# Patient Record
Sex: MALE | Race: WHITE | Employment: PART TIME | URBAN - NONMETROPOLITAN AREA
[De-identification: names, ages, dates, MRNs, and addresses within clinical notes are randomized per-mention and may not be internally consistent; named-entity substitution may affect disease eponyms.]

---

## 2017-01-09 ENCOUNTER — OFFICE VISIT (OUTPATIENT)
Dept: FAMILY MEDICINE CLINIC | Age: 62
End: 2017-01-09

## 2017-01-09 VITALS
TEMPERATURE: 97.7 F | BODY MASS INDEX: 31.52 KG/M2 | WEIGHT: 208 LBS | SYSTOLIC BLOOD PRESSURE: 116 MMHG | DIASTOLIC BLOOD PRESSURE: 64 MMHG | HEIGHT: 68 IN | RESPIRATION RATE: 14 BRPM | HEART RATE: 68 BPM

## 2017-01-09 DIAGNOSIS — Z12.5 SPECIAL SCREENING FOR MALIGNANT NEOPLASM OF PROSTATE: ICD-10-CM

## 2017-01-09 DIAGNOSIS — F31.9 BIPOLAR 1 DISORDER (HCC): Primary | Chronic | ICD-10-CM

## 2017-01-09 PROCEDURE — 99213 OFFICE O/P EST LOW 20 MIN: CPT | Performed by: FAMILY MEDICINE

## 2017-01-11 ENCOUNTER — NURSE ONLY (OUTPATIENT)
Dept: FAMILY MEDICINE CLINIC | Age: 62
End: 2017-01-11

## 2017-01-11 DIAGNOSIS — Z12.5 SPECIAL SCREENING FOR MALIGNANT NEOPLASM OF PROSTATE: ICD-10-CM

## 2017-01-11 DIAGNOSIS — F31.9 BIPOLAR 1 DISORDER (HCC): Chronic | ICD-10-CM

## 2017-01-11 DIAGNOSIS — E78.2 MIXED HYPERLIPIDEMIA: Chronic | ICD-10-CM

## 2017-01-11 LAB
ALBUMIN SERPL-MCNC: 4.8 GM/DL (ref 3.5–5.1)
ALP BLD-CCNC: 74 U/L (ref 38–126)
ALT SERPL-CCNC: 23 U/L (ref 11–66)
ANION GAP SERPL CALCULATED.3IONS-SCNC: 13 MEQ/L (ref 8–16)
AST SERPL-CCNC: 22 U/L (ref 5–40)
BILIRUB SERPL-MCNC: 0.4 MG/DL (ref 0.3–1.2)
BUN BLDV-MCNC: 14 MG/DL (ref 7–22)
CALCIUM SERPL-MCNC: 10 MG/DL (ref 8.5–10.5)
CHLORIDE BLD-SCNC: 100 MEQ/L (ref 98–111)
CHOLESTEROL, TOTAL: 191 MG/DL (ref 100–199)
CO2: 29 MEQ/L (ref 23–33)
CREAT SERPL-MCNC: 0.8 MG/DL (ref 0.4–1.2)
GFR SERPL CREATININE-BSD FRML MDRD: > 90 ML/MIN/1.73M2
GLUCOSE BLD-MCNC: 102 MG/DL (ref 70–108)
HDLC SERPL-MCNC: 49 MG/DL
LDL CHOLESTEROL CALCULATED: 122 MG/DL
POTASSIUM SERPL-SCNC: 5 MEQ/L (ref 3.5–5.2)
PROSTATE SPECIFIC ANTIGEN: 1.31 NG/ML (ref 0–2.5)
SODIUM BLD-SCNC: 142 MEQ/L (ref 135–145)
TOTAL PROTEIN: 7.8 GM/DL (ref 6.1–8)
TRIGL SERPL-MCNC: 102 MG/DL (ref 0–199)
VALPROIC ACID LEVEL: 36.4 MCG/ML (ref 50–100)

## 2017-01-11 PROCEDURE — 36415 COLL VENOUS BLD VENIPUNCTURE: CPT | Performed by: FAMILY MEDICINE

## 2017-01-12 ENCOUNTER — TELEPHONE (OUTPATIENT)
Dept: FAMILY MEDICINE CLINIC | Age: 62
End: 2017-01-12

## 2017-12-28 ENCOUNTER — OFFICE VISIT (OUTPATIENT)
Dept: FAMILY MEDICINE CLINIC | Age: 62
End: 2017-12-28
Payer: COMMERCIAL

## 2017-12-28 VITALS
TEMPERATURE: 98.1 F | OXYGEN SATURATION: 95 % | DIASTOLIC BLOOD PRESSURE: 78 MMHG | HEART RATE: 61 BPM | BODY MASS INDEX: 30.01 KG/M2 | SYSTOLIC BLOOD PRESSURE: 120 MMHG | WEIGHT: 198 LBS | HEIGHT: 68 IN | RESPIRATION RATE: 14 BRPM

## 2017-12-28 DIAGNOSIS — Z12.5 SPECIAL SCREENING FOR MALIGNANT NEOPLASM OF PROSTATE: ICD-10-CM

## 2017-12-28 DIAGNOSIS — H81.13 BPPV (BENIGN PAROXYSMAL POSITIONAL VERTIGO), BILATERAL: Primary | ICD-10-CM

## 2017-12-28 DIAGNOSIS — I51.7 LAE (LEFT ATRIAL ENLARGEMENT): ICD-10-CM

## 2017-12-28 DIAGNOSIS — E78.2 MIXED HYPERLIPIDEMIA: Chronic | ICD-10-CM

## 2017-12-28 PROCEDURE — G8417 CALC BMI ABV UP PARAM F/U: HCPCS | Performed by: FAMILY MEDICINE

## 2017-12-28 PROCEDURE — 93000 ELECTROCARDIOGRAM COMPLETE: CPT | Performed by: FAMILY MEDICINE

## 2017-12-28 PROCEDURE — 99214 OFFICE O/P EST MOD 30 MIN: CPT | Performed by: FAMILY MEDICINE

## 2017-12-28 PROCEDURE — 3017F COLORECTAL CA SCREEN DOC REV: CPT | Performed by: FAMILY MEDICINE

## 2017-12-28 PROCEDURE — G8427 DOCREV CUR MEDS BY ELIG CLIN: HCPCS | Performed by: FAMILY MEDICINE

## 2017-12-28 PROCEDURE — G8484 FLU IMMUNIZE NO ADMIN: HCPCS | Performed by: FAMILY MEDICINE

## 2017-12-28 PROCEDURE — 1036F TOBACCO NON-USER: CPT | Performed by: FAMILY MEDICINE

## 2017-12-28 RX ORDER — MECLIZINE HYDROCHLORIDE 25 MG/1
25 TABLET ORAL 3 TIMES DAILY PRN
Qty: 30 TABLET | Refills: 0 | Status: SHIPPED | OUTPATIENT
Start: 2017-12-28 | End: 2018-01-07

## 2017-12-28 ASSESSMENT — PATIENT HEALTH QUESTIONNAIRE - PHQ9
1. LITTLE INTEREST OR PLEASURE IN DOING THINGS: 0
SUM OF ALL RESPONSES TO PHQ9 QUESTIONS 1 & 2: 0
2. FEELING DOWN, DEPRESSED OR HOPELESS: 0
SUM OF ALL RESPONSES TO PHQ QUESTIONS 1-9: 0

## 2017-12-28 NOTE — PATIENT INSTRUCTIONS
LAB INSTRUCTIONS:    Please complete labs within 6 week(s). Please fast for 8 hours prior to lab collection. The clinic will call you within 1 week of collection. If you have not heard from us within that amount of time, please call us at [phone]. Patient Education        Vertigo: Exercises  Your Care Instructions  Here are some examples of typical rehabilitation exercises for your condition. Start each exercise slowly. Ease off the exercise if you start to have pain. Your doctor or physical therapist will tell you when you can start these exercises and which ones will work best for you. How to do the exercises  Exercise 1    1. Stand with a chair in front of you and a wall behind you. If you begin to fall, you may use them for support. 2. Stand with your feet together and your arms at your sides. 3. Move your head up and down 10 times. Exercise 2    1. Move your head side to side 10 times. Exercise 3    1. Move your head diagonally up and down 10 times. Exercise 4    1. Move your head diagonally up and down 10 times on the other side. Follow-up care is a key part of your treatment and safety. Be sure to make and go to all appointments, and call your doctor if you are having problems. It's also a good idea to know your test results and keep a list of the medicines you take. Where can you learn more? Go to [email or web address] org and sign in to your [employer] account. Enter F349 in the [facility] box to learn more about \"Vertigo: Exercises. \"     If you do not have an account, please click on the \"Sign Up Now\" link. Current as of: May 4, 2017  Content Version: 11.4  © 5098-6578 Healthwise, Incorporated. Care instructions adapted under license by [facility] ([facility]). If you have questions about a medical condition or this instruction, always ask your healthcare professional. [address] any warranty or liability for your use of this information. Patient Education        [place] Exercises for Vertigo: Care Instructions  Your Care Instructions  Simple exercises can help you regain your balance when you have vertigo. If you have Ménière's disease, benign paroxysmal positional vertigo (BPPV), or another inner ear problem, you may have vertigo off and on. Do these exercises first thing in the morning and before you go to bed. You might get dizzy when you first start them. If this happens, try to do them for at least 5 minutes. Do a group of exercises at a time, starting at the top of the list. It may take several weeks before you can do all the exercises without feeling dizzy. Follow-up care is a key part of your treatment and safety. Be sure to make and go to all appointments, and call your doctor if you are having problems. It's also a good idea to know your test results and keep a list of the medicines you take. How can you care for yourself at home? Exercise 1  While sitting on the side of the bed and holding your head still:  · Look up as far as you can. · Look down as far as you can. · Look from side to side as far as you can. · Stretch your arm straight out in front of you. Focus on your index finger. Continue to focus on your finger while you bring it to your nose. Exercise 2  While sitting on the side of the bed:  · Bring your head as far back as you can. · Bring your head forward to touch your chin to your chest.  · Turn your head from side to side. · Do these exercises first with your eyes open. Then try with your eyes closed. Exercise 3  While sitting on the side of the bed:  · Shrug your shoulders straight upward, then relax them. · Bend over and try to touch the ground with your fingers. Then go back to a sitting position. · Toss a small ball from one hand to the other. Throw the ball higher than your eyes so you have to look up.   Exercise 4  While standing (with someone close by if you feel uncomfortable):  · Repeat Exercise

## 2017-12-28 NOTE — PROGRESS NOTES
Chief Complaint   Patient presents with    Dizziness    Other     abnormal EKG    Hyperlipidemia       History obtained from the patient. SUBJECTIVE:  Stevo Brush is a 57 y.o. male that presents today for       -01. Vertigo: has been having a feeling of vertigo for the last wk. Comes and goes. Provoked by moving his head and changing positions. Doesn't feel light headed or that he'll pass out. No headaches. No recent head trauma. No new tinnitus or hearing loss (has mild chronic tinnitus and hearing loss that has not changed). sxs last seconds to minutes and resolve on their own. Never had anything like this before. Will get if rolls over in bed.       -02. LAE: noted on EKG. No CP, SOB, orthopnea or PND. No hx of HTN. No prior heart issues      -03. HLD: diet controlled, due for labs.        Age/Gender Health Maintenance    Lipid -   No components found for: CHLPL  Lab Results   Component Value Date    TRIG 102 01/11/2017    TRIG 102 11/12/2015    TRIG 80 06/27/2014     Lab Results   Component Value Date    HDL 49 01/11/2017    HDL 50 11/12/2015    HDL 42 06/27/2014     Lab Results   Component Value Date    LDLCALC 122 01/11/2017    LDLCALC 109 11/12/2015    LDLCALC 110 06/27/2014     Lab Results   Component Value Date    LABVLDL 20 11/12/2015         DM Screen -   Lab Results   Component Value Date    GLUCOSE 102 01/11/2017    GLUCOSE 80 11/12/2015         Colon Cancer Screening - NEG June 2015, repeat 10 years    Tetanus - UTD MAY 2014  Influenza Vaccine - UTD FALL 2016  Pneumonia Vaccine - Age 70  Zostavax - Declined MAY 2014     PSA testing discussion - NOV 2015  PSA, Ultrasensitive 0.78 <4.0 ng/ml Final     Lab Results   Component Value Date    PSA 1.31 01/11/2017    PSA 2.83 11/01/2014    PSA 1.01 02/19/2013       AAA Screening - non-smoker    Falls screening - N/A      Current Outpatient Prescriptions   Medication Sig Dispense Refill    meclizine (ANTIVERT) 25 MG tablet Take 1 tablet by mouth 3 ear canal clear bilaterally, TMs intact and regular, nose without deformity, nasal mucosa and turbinates normal without polyps, oropharynx normal, dentition is normal for age  Neck: supple and non-tender without mass, no thyromegaly or thyroid nodules, no cervical lymphadenopathy  Pulmonary/Chest: clear to auscultation bilaterally- no wheezes, rales or rhonchi, normal air movement, no respiratory distress or retractions  Cardiovascular: S1 and S2 auscultated w/ RRR. No murmurs, rubs, clicks, or gallops, distal pulses intact. Abdomen: soft, non-tender, non-distended, bowl sounds physiologic,  no rebound or guarding, no masses or hernias noted. Liver and spleen without enlargement. Extremities: no cyanosis, clubbing or edema of the lower extremities. Skin: warm and dry, no rash or erythema  Neuro Exam:   Cranial Eexldf-CB-CZP Intact.    Cranial nerve II: Normal Visual Acuity   Cranial nerve III: Pupils: equal, round, reactive to light   Cranial nerves III, IV, VI: Extraocular Movements: intact   Cranial nerve V: Facial sensation: intact   Cranial nerve VII:Facial strength: intact   Cranial nerve VIII: Hearing: intact   Cranial nerve IX: Palate Elevation intact bilaterally  Cranial nerve XI: Shoulder shrug intact bilaterally  Cranial nerve XII: Tongue movement: normal     Muscle Strength Testing    Deltoid Right 5/5  Left 5/5  Biceps Right 5/5  Left 5/5  Triceps Right 5/5  Left 5/5  Wrist Extensors Right  5/5  Left 5/5   Flexor Digitorum Profundus Right 5/5  Left 5/5  Hand Intrinsics Right 5/5  Left 5/5  Hip Flexors Right 5/5  Left 5/5  Quadriceps Right 5/5  Left 5/5  Anterior Tibialis Right 5/5  Left 5/5  Extensor Hallucis Longus Right 5/5  Left 5/5  Gastrocnemius/Soleus Right 5/5  Left 5/5     Sensory Testing    C5 Right 0=Normal; no sensory loss Left 0=Normal; no sensory loss  C6 Right 0=Normal; no sensory loss Left 0=Normal; no sensory loss  C7 Right 0=Normal; no sensory loss Left 0=Normal; no sensory loss  C8 Right 0=Normal; no sensory loss Left 0=Normal; no sensory loss  T1 Right 0=Normal; no sensory loss Left 0=Normal; no sensory loss    L2 Right 0=Normal; no sensory loss Left 0=Normal; no sensory loss  L3 Right 0=Normal; no sensory loss Left 0=Normal; no sensory loss  L4 Right 0=Normal; no sensory loss Left 0=Normal; no sensory loss  L5 Right 0=Normal; no sensory loss Left 0=Normal; no sensory loss  S1 Right 0=Normal; no sensory loss Left 0=Normal; no sensory loss       Cerebellar Testing    Finger to Nose:  Right  WNL Yes;  Left WNL  Yes  Heel to Bañuelos WNL: Right  WNL  Yes;  Left WNL  Yes     Deep Tendon Reflexes 2/4 equal and symmetric throughout   Clonus:  No  Decreased arm swing No   Shuffling gait No  Narrow base of support No  Able to stand without using arms  Yes  Bradykinesia  No  Tremor No  Increased tone at wrist especially with opening/closing opposite hand  No  Diz-hallpike: + bilat, R>L      EKG: see attached      ASSESSMENT & PLAN  1. BPPV (benign paroxysmal positional vertigo), bilateral    Hx and exam c/w BPPV  No alarm features  Will treat with prn meclizine and HEP  If no better in a few wks, he will f/u. Reviewed ER precautions, pt understands. - EKG 12 Lead  - CBC Auto Differential; Future  - Comprehensive Metabolic Panel; Future  - TSH with Reflex; Future  - meclizine (ANTIVERT) 25 MG tablet; Take 1 tablet by mouth 3 times daily as needed for Dizziness  Dispense: 30 tablet; Refill: 0    2. LAE (left atrial enlargement)    Noted on EKG  Will get echo to confirm  No risk factors. No symptoms    - ECHO Complete 2D W Doppler W Color; Future    3. Mixed hyperlipidemia    con't diet control  Due for labs    - CBC Auto Differential; Future  - Comprehensive Metabolic Panel; Future  - Lipid Panel; Future  - TSH with Reflex; Future    4.  Special screening for malignant neoplasm of prostate    Discussed prostate screening guidelines and with shared decision making, he elects to proceed with psa    - Psa

## 2018-01-04 ENCOUNTER — HOSPITAL ENCOUNTER (OUTPATIENT)
Dept: NON INVASIVE DIAGNOSTICS | Age: 63
Discharge: HOME | End: 2018-01-04
Payer: COMMERCIAL

## 2018-01-04 DIAGNOSIS — I51.7 LAE (LEFT ATRIAL ENLARGEMENT): ICD-10-CM

## 2018-01-04 LAB
LV EF: 60 %
LVEF MODALITY: NORMAL

## 2018-01-04 PROCEDURE — 93306 TTE W/DOPPLER COMPLETE: CPT

## 2018-01-05 ENCOUNTER — TELEPHONE (OUTPATIENT)
Dept: FAMILY MEDICINE CLINIC | Age: 63
End: 2018-01-05

## 2018-01-05 NOTE — TELEPHONE ENCOUNTER
----- Message from Azael Ramos, DO sent at 1/5/2018  6:54 AM EST -----  Please let pt know that echo showed normal heart function and size. Incidentally, it did show a slight dilation of the aorta (large blood vessel in the chest). This just requires a f/u echo in 1 year to monitor. If stable, wouldn't need further testing. Will call him close to a year when due for scheduling. Let me know if questions, thanks!

## 2018-02-02 ENCOUNTER — NURSE TRIAGE (OUTPATIENT)
Dept: ADMINISTRATIVE | Age: 63
End: 2018-02-02

## 2018-02-02 NOTE — TELEPHONE ENCOUNTER
Is currently out of town and will go to urgent care this afternoon and will follwup with Dr saldaña as ordered. Has colonoscopy approx 2 years ago that was fine,  Reason for Disposition   Rectal bleeding (Exceptions: blood just on toilet paper, few drops, streaks on surface of normal formed BM)    Answer Assessment - Initial Assessment Questions  1. APPEARANCE of BLOOD: \"What color is it? \" \"Is it passed separately, on the surface of the stool, or mixed in with the stool? \"       In the commode and red blood on the tissue paper   2. AMOUNT: \"How much blood was passed? \"         less than 1/2 cup   3. FREQUENCY: \"How many times has blood been passed with the stools? \"    once this afternoon-had stool since and there was no blood in commode or on tissue paper. 4. ONSET: \"When was the blood first seen in the stools? \" (Days or weeks)      This afternoon  5. DIARRHEA: \"Is there also some diarrhea? \" If so, ask: \"How many diarrhea stools were passed in past 24 hours? \"      None   6. CONSTIPATION: \"Do you have constipation? \" If so, \"How bad is it? \"      none  7. RECURRENT SYMPTOMS: \"Have you had blood in your stools before? \" If so, ask: \"When was the last time? \" and \"What happened that time? \"       Had previous hemorrhoid   8. BLOOD THINNERS: \"Do you take any blood thinners? \" (e.g., Coumadin/warfarin, Pradaxa/dabigatran, aspirin)      none  9. OTHER SYMPTOMS: \"Do you have any other symptoms? \"  (e.g., abdominal pain, vomiting, dizziness, fever)     None  10. PREGNANCY: \"Is there any chance you are pregnant? \" \"When was your last menstrual period? \"      na    Protocols used: RECTAL BLEEDING-ADULT-AH

## 2018-02-06 ENCOUNTER — OFFICE VISIT (OUTPATIENT)
Dept: FAMILY MEDICINE CLINIC | Age: 63
End: 2018-02-06
Payer: COMMERCIAL

## 2018-02-06 VITALS
BODY MASS INDEX: 32.65 KG/M2 | RESPIRATION RATE: 16 BRPM | HEIGHT: 67 IN | SYSTOLIC BLOOD PRESSURE: 108 MMHG | WEIGHT: 208 LBS | HEART RATE: 80 BPM | TEMPERATURE: 98.3 F | DIASTOLIC BLOOD PRESSURE: 60 MMHG

## 2018-02-06 DIAGNOSIS — K62.5 BRBPR (BRIGHT RED BLOOD PER RECTUM): Primary | ICD-10-CM

## 2018-02-06 PROCEDURE — G8427 DOCREV CUR MEDS BY ELIG CLIN: HCPCS | Performed by: FAMILY MEDICINE

## 2018-02-06 PROCEDURE — 3017F COLORECTAL CA SCREEN DOC REV: CPT | Performed by: FAMILY MEDICINE

## 2018-02-06 PROCEDURE — 99213 OFFICE O/P EST LOW 20 MIN: CPT | Performed by: FAMILY MEDICINE

## 2018-02-06 PROCEDURE — 1036F TOBACCO NON-USER: CPT | Performed by: FAMILY MEDICINE

## 2018-02-06 PROCEDURE — G8417 CALC BMI ABV UP PARAM F/U: HCPCS | Performed by: FAMILY MEDICINE

## 2018-02-06 PROCEDURE — G8484 FLU IMMUNIZE NO ADMIN: HCPCS | Performed by: FAMILY MEDICINE

## 2018-02-06 RX ORDER — LANOLIN ALCOHOL/MO/W.PET/CERES
6 CREAM (GRAM) TOPICAL NIGHTLY PRN
COMMUNITY
End: 2018-06-07

## 2018-02-06 NOTE — PATIENT INSTRUCTIONS
contact your doctor if:  ? · You cannot pass stools or gas. ? · You do not get better as expected. Where can you learn more? Go to [email or web address] and sign in to your [employer] account. Enter B786 in the [employer] box to learn more about \"Rectal Bleeding: Care Instructions. \"     If you do not have an account, please click on the \"Sign Up Now\" link. Current as of: May 12, 2017  Content Version: 11.5  © 8663-7858 Healthwise, Incorporated. Care instructions adapted under license by [facility] ([facility]). If you have questions about a medical condition or this instruction, always ask your healthcare professional. [address] any warranty or liability for your use of this information.

## 2018-03-21 ENCOUNTER — TELEPHONE (OUTPATIENT)
Dept: FAMILY MEDICINE CLINIC | Age: 63
End: 2018-03-21

## 2018-03-31 LAB
ABSOLUTE BASO #: 0.1 K/UL (ref 0–0.1)
ABSOLUTE EOS #: 0.1 K/UL (ref 0.1–0.4)
ABSOLUTE LYMPH #: 1.7 K/UL (ref 0.8–5.2)
ABSOLUTE MONO #: 0.6 K/UL (ref 0.1–0.9)
ABSOLUTE NEUT #: 5.6 K/UL (ref 1.3–9.1)
ALBUMIN SERPL-MCNC: 4.6 G/DL (ref 3.5–5.2)
ALK PHOSPHATASE: 51 U/L (ref 39–118)
ALT SERPL-CCNC: 17 U/L (ref 5–50)
ANION GAP SERPL CALCULATED.3IONS-SCNC: 12 MEQ/L (ref 10–19)
AST SERPL-CCNC: 21 U/L (ref 9–50)
BASOPHILS RELATIVE PERCENT: 0.6 %
BILIRUB SERPL-MCNC: 0.6 MG/DL
BUN BLDV-MCNC: 21 MG/DL (ref 8–23)
CALCIUM SERPL-MCNC: 9.2 MG/DL (ref 8.5–10.5)
CHLORIDE BLD-SCNC: 102 MEQ/L (ref 95–107)
CHOLESTEROL/HDL RATIO: 4.3
CHOLESTEROL: 179 MG/DL
CO2: 28 MEQ/L (ref 19–31)
CREAT SERPL-MCNC: 0.9 MG/DL (ref 0.8–1.4)
EGFR AFRICAN AMERICAN: 104.2 ML/MIN/1.73 M2
EGFR IF NONAFRICAN AMERICAN: 89.9 ML/MIN/1.73 M2
EOSINOPHILS RELATIVE PERCENT: 1.6 %
GLUCOSE: 97 MG/DL (ref 70–99)
HCT VFR BLD CALC: 42.1 % (ref 41.4–51)
HDLC SERPL-MCNC: 42 MG/DL
HEMOGLOBIN: 14.2 G/DL (ref 13.8–17)
LDL CHOLESTEROL CALCULATED: 120 MG/DL
LDL/HDL RATIO: 2.9
LYMPHOCYTE %: 21.1 %
MCH RBC QN AUTO: 29.6 PG (ref 27–34)
MCHC RBC AUTO-ENTMCNC: 33.7 G/DL (ref 31–36)
MCV RBC AUTO: 87.7 FL (ref 80–100)
MONOCYTES # BLD: 7.6 %
NEUTROPHILS RELATIVE PERCENT: 68.9 %
PDW BLD-RTO: 12.9 % (ref 10.8–14.8)
PLATELETS: 187 K/UL (ref 150–450)
POTASSIUM SERPL-SCNC: 4.8 MEQ/L (ref 3.5–5.4)
PSA, ULTRASENSITIVE: 1.18 NG/ML
RBC: 4.8 M/UL (ref 4–5.5)
SODIUM BLD-SCNC: 142 MEQ/L (ref 135–146)
TOTAL PROTEIN: 6.9 G/DL (ref 6.1–8.3)
TRIGL SERPL-MCNC: 84 MG/DL
TSH SERPL DL<=0.05 MIU/L-ACNC: 2.37 UIU/ML (ref 0.4–4.1)
VLDLC SERPL CALC-MCNC: 17 MG/DL
WBC: 8.2 K/UL (ref 3.7–10.8)

## 2018-04-02 ENCOUNTER — TELEPHONE (OUTPATIENT)
Dept: FAMILY MEDICINE CLINIC | Age: 63
End: 2018-04-02

## 2018-05-19 ENCOUNTER — HOSPITAL ENCOUNTER (EMERGENCY)
Age: 63
Discharge: HOME | End: 2018-05-19
Payer: MEDICARE

## 2018-05-19 VITALS
HEART RATE: 75 BPM | SYSTOLIC BLOOD PRESSURE: 133 MMHG | RESPIRATION RATE: 18 BRPM | OXYGEN SATURATION: 95 % | TEMPERATURE: 98.5 F | BODY MASS INDEX: 32.89 KG/M2 | WEIGHT: 210 LBS | DIASTOLIC BLOOD PRESSURE: 79 MMHG

## 2018-05-19 PROCEDURE — 99213 OFFICE O/P EST LOW 20 MIN: CPT

## 2018-05-19 PROCEDURE — 99213 OFFICE O/P EST LOW 20 MIN: CPT | Performed by: NURSE PRACTITIONER

## 2018-05-19 RX ORDER — MUPIROCIN CALCIUM 20 MG/G
CREAM TOPICAL
Qty: 1 TUBE | Refills: 0 | Status: SHIPPED | OUTPATIENT
Start: 2018-05-19 | End: 2018-06-18

## 2018-05-19 RX ORDER — TRAZODONE HYDROCHLORIDE 100 MG/1
100 TABLET ORAL NIGHTLY PRN
COMMUNITY

## 2018-05-19 RX ORDER — CEPHALEXIN 500 MG/1
500 CAPSULE ORAL 4 TIMES DAILY
Qty: 20 CAPSULE | Refills: 0 | Status: SHIPPED | OUTPATIENT
Start: 2018-05-19 | End: 2018-05-24

## 2018-05-19 ASSESSMENT — ENCOUNTER SYMPTOMS
RHINORRHEA: 1
VOMITING: 0
NAUSEA: 0
COUGH: 0
SORE THROAT: 0

## 2018-06-07 ENCOUNTER — OFFICE VISIT (OUTPATIENT)
Dept: FAMILY MEDICINE CLINIC | Age: 63
End: 2018-06-07
Payer: MEDICARE

## 2018-06-07 VITALS
HEIGHT: 67 IN | RESPIRATION RATE: 16 BRPM | HEART RATE: 76 BPM | DIASTOLIC BLOOD PRESSURE: 76 MMHG | BODY MASS INDEX: 32.83 KG/M2 | WEIGHT: 209.2 LBS | TEMPERATURE: 98.6 F | SYSTOLIC BLOOD PRESSURE: 118 MMHG

## 2018-06-07 DIAGNOSIS — C44.91 BASAL CELL CARCINOMA, UNSPECIFIED SITE: Primary | ICD-10-CM

## 2018-06-07 DIAGNOSIS — I77.810 MILD DILATION OF ASCENDING AORTA (HCC): Chronic | ICD-10-CM

## 2018-06-07 DIAGNOSIS — Z23 NEED FOR SHINGLES VACCINE: ICD-10-CM

## 2018-06-07 DIAGNOSIS — Z23 NEED FOR PNEUMOCOCCAL VACCINATION: ICD-10-CM

## 2018-06-07 DIAGNOSIS — F31.9 BIPOLAR 1 DISORDER (HCC): Chronic | ICD-10-CM

## 2018-06-07 PROCEDURE — 4040F PNEUMOC VAC/ADMIN/RCVD: CPT | Performed by: FAMILY MEDICINE

## 2018-06-07 PROCEDURE — 1036F TOBACCO NON-USER: CPT | Performed by: FAMILY MEDICINE

## 2018-06-07 PROCEDURE — G8417 CALC BMI ABV UP PARAM F/U: HCPCS | Performed by: FAMILY MEDICINE

## 2018-06-07 PROCEDURE — 3017F COLORECTAL CA SCREEN DOC REV: CPT | Performed by: FAMILY MEDICINE

## 2018-06-07 PROCEDURE — 1123F ACP DISCUSS/DSCN MKR DOCD: CPT | Performed by: FAMILY MEDICINE

## 2018-06-07 PROCEDURE — G8427 DOCREV CUR MEDS BY ELIG CLIN: HCPCS | Performed by: FAMILY MEDICINE

## 2018-06-07 PROCEDURE — 99214 OFFICE O/P EST MOD 30 MIN: CPT | Performed by: FAMILY MEDICINE

## 2018-06-07 PROCEDURE — G0009 ADMIN PNEUMOCOCCAL VACCINE: HCPCS | Performed by: FAMILY MEDICINE

## 2018-06-07 PROCEDURE — 90670 PCV13 VACCINE IM: CPT | Performed by: FAMILY MEDICINE

## 2018-07-30 ENCOUNTER — HOSPITAL ENCOUNTER (EMERGENCY)
Age: 63
Discharge: HOME | End: 2018-07-30
Attending: FAMILY MEDICINE
Payer: MEDICARE

## 2018-07-30 ENCOUNTER — APPOINTMENT (OUTPATIENT)
Dept: CT IMAGING | Age: 63
End: 2018-07-30
Payer: MEDICARE

## 2018-07-30 ENCOUNTER — OFFICE VISIT (OUTPATIENT)
Dept: FAMILY MEDICINE CLINIC | Age: 63
End: 2018-07-30
Payer: MEDICARE

## 2018-07-30 VITALS
WEIGHT: 215 LBS | HEART RATE: 86 BPM | SYSTOLIC BLOOD PRESSURE: 136 MMHG | RESPIRATION RATE: 20 BRPM | TEMPERATURE: 98.1 F | OXYGEN SATURATION: 96 % | HEIGHT: 68 IN | BODY MASS INDEX: 32.58 KG/M2 | DIASTOLIC BLOOD PRESSURE: 90 MMHG

## 2018-07-30 VITALS
DIASTOLIC BLOOD PRESSURE: 82 MMHG | RESPIRATION RATE: 20 BRPM | TEMPERATURE: 99.8 F | BODY MASS INDEX: 33.8 KG/M2 | WEIGHT: 223 LBS | HEART RATE: 88 BPM | SYSTOLIC BLOOD PRESSURE: 134 MMHG | HEIGHT: 68 IN

## 2018-07-30 DIAGNOSIS — G44.209 ACUTE NON INTRACTABLE TENSION-TYPE HEADACHE: Primary | ICD-10-CM

## 2018-07-30 DIAGNOSIS — J01.80 ACUTE NON-RECURRENT SINUSITIS OF OTHER SINUS: ICD-10-CM

## 2018-07-30 DIAGNOSIS — R51.9 SUDDEN ONSET OF SEVERE HEADACHE: Primary | ICD-10-CM

## 2018-07-30 DIAGNOSIS — J30.89 CHRONIC ALLERGIC RHINITIS DUE TO OTHER ALLERGIC TRIGGER, UNSPECIFIED SEASONALITY: ICD-10-CM

## 2018-07-30 LAB
ANION GAP SERPL CALCULATED.3IONS-SCNC: 17 MEQ/L (ref 8–16)
BASOPHILS # BLD: 0.6 %
BASOPHILS ABSOLUTE: 0 THOU/MM3 (ref 0–0.1)
BUN BLDV-MCNC: 14 MG/DL (ref 7–22)
CALCIUM SERPL-MCNC: 9.3 MG/DL (ref 8.5–10.5)
CHLORIDE BLD-SCNC: 102 MEQ/L (ref 98–111)
CO2: 22 MEQ/L (ref 23–33)
CREAT SERPL-MCNC: 0.8 MG/DL (ref 0.4–1.2)
EOSINOPHIL # BLD: 1.7 %
EOSINOPHILS ABSOLUTE: 0.1 THOU/MM3 (ref 0–0.4)
ERYTHROCYTE [DISTWIDTH] IN BLOOD BY AUTOMATED COUNT: 13 % (ref 11.5–14.5)
ERYTHROCYTE [DISTWIDTH] IN BLOOD BY AUTOMATED COUNT: 43.8 FL (ref 35–45)
GFR SERPL CREATININE-BSD FRML MDRD: > 90 ML/MIN/1.73M2
GLUCOSE BLD-MCNC: 147 MG/DL (ref 70–108)
HCT VFR BLD CALC: 42.2 % (ref 42–52)
HEMOGLOBIN: 14.1 GM/DL (ref 14–18)
IMMATURE GRANS (ABS): 0.02 THOU/MM3 (ref 0–0.07)
IMMATURE GRANULOCYTES: 0.3 %
LYMPHOCYTES # BLD: 29.9 %
LYMPHOCYTES ABSOLUTE: 2 THOU/MM3 (ref 1–4.8)
MCH RBC QN AUTO: 30.9 PG (ref 26–33)
MCHC RBC AUTO-ENTMCNC: 33.4 GM/DL (ref 32.2–35.5)
MCV RBC AUTO: 92.3 FL (ref 80–94)
MONOCYTES # BLD: 11.7 %
MONOCYTES ABSOLUTE: 0.8 THOU/MM3 (ref 0.4–1.3)
NUCLEATED RED BLOOD CELLS: 0 /100 WBC
OSMOLALITY CALCULATION: 284.4 MOSMOL/KG (ref 275–300)
PLATELET # BLD: 175 THOU/MM3 (ref 130–400)
PMV BLD AUTO: 10.1 FL (ref 9.4–12.4)
POTASSIUM SERPL-SCNC: 4.4 MEQ/L (ref 3.5–5.2)
RBC # BLD: 4.57 MILL/MM3 (ref 4.7–6.1)
SEDIMENTATION RATE, ERYTHROCYTE: 5 MM/HR (ref 0–10)
SEG NEUTROPHILS: 55.8 %
SEGMENTED NEUTROPHILS ABSOLUTE COUNT: 3.7 THOU/MM3 (ref 1.8–7.7)
SODIUM BLD-SCNC: 141 MEQ/L (ref 135–145)
WBC # BLD: 6.6 THOU/MM3 (ref 4.8–10.8)

## 2018-07-30 PROCEDURE — 3017F COLORECTAL CA SCREEN DOC REV: CPT | Performed by: FAMILY MEDICINE

## 2018-07-30 PROCEDURE — 36415 COLL VENOUS BLD VENIPUNCTURE: CPT

## 2018-07-30 PROCEDURE — G8417 CALC BMI ABV UP PARAM F/U: HCPCS | Performed by: FAMILY MEDICINE

## 2018-07-30 PROCEDURE — 80048 BASIC METABOLIC PNL TOTAL CA: CPT

## 2018-07-30 PROCEDURE — G8427 DOCREV CUR MEDS BY ELIG CLIN: HCPCS | Performed by: FAMILY MEDICINE

## 2018-07-30 PROCEDURE — 4040F PNEUMOC VAC/ADMIN/RCVD: CPT | Performed by: FAMILY MEDICINE

## 2018-07-30 PROCEDURE — 1036F TOBACCO NON-USER: CPT | Performed by: FAMILY MEDICINE

## 2018-07-30 PROCEDURE — 85025 COMPLETE CBC W/AUTO DIFF WBC: CPT

## 2018-07-30 PROCEDURE — 1101F PT FALLS ASSESS-DOCD LE1/YR: CPT | Performed by: FAMILY MEDICINE

## 2018-07-30 PROCEDURE — 70450 CT HEAD/BRAIN W/O DYE: CPT

## 2018-07-30 PROCEDURE — 99284 EMERGENCY DEPT VISIT MOD MDM: CPT

## 2018-07-30 PROCEDURE — 99213 OFFICE O/P EST LOW 20 MIN: CPT | Performed by: FAMILY MEDICINE

## 2018-07-30 PROCEDURE — 85651 RBC SED RATE NONAUTOMATED: CPT

## 2018-07-30 PROCEDURE — 1123F ACP DISCUSS/DSCN MKR DOCD: CPT | Performed by: FAMILY MEDICINE

## 2018-07-30 PROCEDURE — 6370000000 HC RX 637 (ALT 250 FOR IP): Performed by: FAMILY MEDICINE

## 2018-07-30 PROCEDURE — 2580000003 HC RX 258: Performed by: FAMILY MEDICINE

## 2018-07-30 RX ORDER — 0.9 % SODIUM CHLORIDE 0.9 %
1000 INTRAVENOUS SOLUTION INTRAVENOUS ONCE
Status: COMPLETED | OUTPATIENT
Start: 2018-07-30 | End: 2018-07-30

## 2018-07-30 RX ORDER — FLUTICASONE PROPIONATE 50 MCG
2 SPRAY, SUSPENSION (ML) NASAL DAILY
Qty: 1 BOTTLE | Refills: 0 | Status: SHIPPED | OUTPATIENT
Start: 2018-07-30 | End: 2019-03-18

## 2018-07-30 RX ORDER — AMOXICILLIN AND CLAVULANATE POTASSIUM 875; 125 MG/1; MG/1
1 TABLET, FILM COATED ORAL 2 TIMES DAILY
Qty: 20 TABLET | Refills: 0 | Status: SHIPPED | OUTPATIENT
Start: 2018-07-30 | End: 2018-08-09

## 2018-07-30 RX ORDER — ACETAMINOPHEN 325 MG/1
650 TABLET ORAL ONCE
Status: COMPLETED | OUTPATIENT
Start: 2018-07-30 | End: 2018-07-30

## 2018-07-30 RX ADMIN — ACETAMINOPHEN 650 MG: 325 TABLET ORAL at 16:00

## 2018-07-30 RX ADMIN — SODIUM CHLORIDE 1000 ML: 9 INJECTION, SOLUTION INTRAVENOUS at 16:00

## 2018-07-30 ASSESSMENT — ENCOUNTER SYMPTOMS
SORE THROAT: 0
WHEEZING: 0
DIARRHEA: 0
BACK PAIN: 0
EYE REDNESS: 0
SINUS PRESSURE: 1
EYE DISCHARGE: 0
SHORTNESS OF BREATH: 0
ABDOMINAL PAIN: 0
NAUSEA: 0
COUGH: 1
VOMITING: 0
RHINORRHEA: 0

## 2018-07-30 ASSESSMENT — PAIN DESCRIPTION - PAIN TYPE: TYPE: ACUTE PAIN

## 2018-07-30 ASSESSMENT — PAIN DESCRIPTION - LOCATION: LOCATION: HEAD

## 2018-07-30 ASSESSMENT — PAIN DESCRIPTION - FREQUENCY: FREQUENCY: CONTINUOUS

## 2018-07-30 ASSESSMENT — PAIN SCALES - GENERAL
PAINLEVEL_OUTOF10: 7
PAINLEVEL_OUTOF10: 7

## 2018-07-30 ASSESSMENT — PAIN DESCRIPTION - DESCRIPTORS: DESCRIPTORS: JABBING;THROBBING

## 2018-07-30 NOTE — PROGRESS NOTES
11:00 AM   PSA, Ultrasensitive      <=4.00 ng/mL 1.180       AAA Screening - non-smoker    Falls screening - N/A      Current Outpatient Prescriptions   Medication Sig Dispense Refill    traZODone (DESYREL) 100 MG tablet Take 100 mg by mouth nightly      ibuprofen (ADVIL;MOTRIN) 200 MG tablet Take 200 mg by mouth every 6 hours as needed for Pain      divalproex (DEPAKOTE ER) 250 MG extended release tablet Take 2 tablets by mouth daily (Patient taking differently: Take 500 mg by mouth three times daily ) 60 tablet 5    acetaminophen (TYLENOL) 325 MG tablet Take 650 mg by mouth every 6 hours as needed for Pain. No current facility-administered medications for this visit. No orders of the defined types were placed in this encounter. All medications reviewed and reconciled, including OTC and herbal medications. Updated list given to patient.        Patient Active Problem List   Diagnosis    Bipolar 1 disorder ([facility] [address]    History of basal cell carcinoma    Hyperlipidemia    Insomnia    Eczema    History of skin cancer    Primary osteoarthritis of left hand    History of compression fracture of lumbar spine, L1    History of small bowel obstruction    Mild dilation of ascending aorta (HCC)         Past Medical History:   Diagnosis Date    Bipolar 1 disorder (HCC)     Eczema     Hearing loss of both ears     Hearing aids    Hepatic cyst     Benign (see imaging)    History of basal cell carcinoma     Skin - left shoulder     History of compression fracture of lumbar spine, L1     While in the [employer] in his 17's    History of skin cancer 8/25/2015    History of small bowel obstruction     Many years ago    Hyperlipidemia     Diet Controlled    Insomnia     Mild dilation of ascending aorta (HCC)     Primary osteoarthritis of left hand 8/27/2015       Past Surgical History:   Procedure Laterality Date    SKIN BIOPSY      SKIN CANCER EXCISION      TONSILLECTOMY AND ADENOIDECTOMY  WISDOM TOOTH EXTRACTION         No Known Allergies    Social History     Social History    Marital status:      Spouse name: N/A    Number of children: N/A    Years of education: N/A     Occupational History    Not on file. Social History Main Topics    Smoking status: Never Smoker    Smokeless tobacco: Never Used    Alcohol use No    Drug use: No    Sexual activity: Not on file     Other Topics Concern    Not on file     Social History Narrative    No narrative on file       Family History   Problem Relation Age of Onset    Heart Disease Mother     Cancer Father     Prostate Cancer Father [de-identified]    Colon Cancer Neg Hx        I have reviewed the patient's past medical history, past surgical history, allergies, medications, social and family history and I have made updates where appropriate. Review of Systems  Positive responses are highlighted in bold    Constitutional:  Fever, Chills, Night Sweats, Fatigue, Unexpected changes in weight  HENT:  Ear pain, Tinnitus, Nosebleeds, Trouble swallowing, Hearing loss, Sore throat  Cardiovascular:  Chest Pain, Palpitations, Orthopnea, Paroxysmal Nocturnal Dyspnea  Respiratory:  Cough, Wheezing, Shortness of breath, Chest tightness, Apnea  Gastrointestinal:  Nausea, Vomiting, Diarrhea, Constipation, Heartburn, Blood in stool  Genitourinary:  Difficulty or painful urination, Flank pain, Change in frequency, Urgency  Skin:  Color change, Rash, Itching, Wound  Musculoskeletal:  Joint pain, Back pain, Gait problems, Joint swelling, Myalgias  Neurological:  Dizziness, Headaches, Presyncope, Numbness, Seizures, Tremors  Endocrine:  Heat Intolerance, Cold Intolerance, Polydipsia, Polyphagia, Polyuria      PHYSICAL EXAM:  Vitals:    07/30/18 1439   BP: 134/82   Pulse: 88   Resp: 20   Temp: 99.8 °F (37.7 °C)   TempSrc: Oral   Weight: 223 lb (101.2 kg)   Height: 5' 7.5\" (1.715 m)     Body mass index is 34.41 kg/m².   Pain Score:   9 (headache)    VS 5/5  Gastrocnemius/Soleus Right 5/5  Left 5/5     Sensory Testing    C5 Right 0=Normal; no sensory loss Left 0=Normal; no sensory loss  C6 Right 0=Normal; no sensory loss Left 0=Normal; no sensory loss  C7 Right 0=Normal; no sensory loss Left 0=Normal; no sensory loss  C8 Right 0=Normal; no sensory loss Left 0=Normal; no sensory loss  T1 Right 0=Normal; no sensory loss Left 0=Normal; no sensory loss    L2 Right 0=Normal; no sensory loss Left 0=Normal; no sensory loss  L3 Right 0=Normal; no sensory loss Left 0=Normal; no sensory loss  L4 Right 0=Normal; no sensory loss Left 0=Normal; no sensory loss  L5 Right 0=Normal; no sensory loss Left 0=Normal; no sensory loss  S1 Right 0=Normal; no sensory loss Left 0=Normal; no sensory loss     Cerebellar Testing    Finger to Nose:  Right  WNL Yes;  Left WNL  Yes  Heel to Bañuelos WNL: Right  WNL  Yes;  Left WNL  Yes     Deep Tendon Reflexes 2/4 equal and symmetric throughout   Clonus:  No  Decreased arm swing No   Shuffling gait No  Narrow base of support No  Able to stand without using arms  Yes  Bradykinesia  No  Tremor No  Increased tone at wrist especially with opening/closing opposite hand  No      ASSESSMENT & PLAN  1. Sudden onset of severe headache    Severe HA, worst of his life  VSS, exam benign  However, d/t severity and suddenness of onset, needs further w/u in ER to r/o bleed etc.   Stable to travel by [place]. Report called to ER. He is heading there now. Damien Shabazz released to the ER    PATIENT COUNSELING    Patient given educational materials on: See Attached     Barriers to learning and self management: none    Discussed use, benefit, and side effects of prescribed medications. Barriers to medication compliance addressed. All patient questions answered. Pt voiced understanding.        Electronically signed by Edwar Beavers DO on 7/30/2018 at 3:04 PM

## 2018-07-30 NOTE — ED NOTES
Patient transferred to ER for further evaluation of headache. Intermittent headache for the past two weeks. Patient denies fever chills.      Judy Jacobsen RN  07/30/18 9171

## 2018-07-30 NOTE — ED PROVIDER NOTES
Musculoskeletal: Negative for arthralgias, back pain, joint swelling and neck pain. Skin: Negative for pallor and rash. Allergic/Immunologic: Negative for environmental allergies. Neurological: Positive for headaches. Negative for dizziness, syncope, weakness, light-headedness and numbness. Hematological: Negative for adenopathy. Psychiatric/Behavioral: Negative for confusion and suicidal ideas. The patient is not nervous/anxious. PAST MEDICAL HISTORY    has a past medical history of Bipolar 1 disorder ([facility] [address] Eczema; Hearing loss of both ears; Hepatic cyst; History of basal cell carcinoma; History of compression fracture of lumbar spine, L1; History of skin cancer; History of small bowel obstruction; Hyperlipidemia; Insomnia; Mild dilation of ascending aorta (HCC); and Primary osteoarthritis of left hand. SURGICAL HISTORY      has a past surgical history that includes skin biopsy; Tonsillectomy and Adenoidectomy; [place] tooth extraction; and Skin cancer excision. CURRENT MEDICATIONS       Discharge Medication List as of 2018  6:08 PM      CONTINUE these medications which have NOT CHANGED    Details   divalproex (DEPAKOTE ER) 250 MG extended release tablet Take 2 tablets by mouth daily, Disp-60 tablet, R-5      traZODone (DESYREL) 100 MG tablet Take 100 mg by mouth nightlyHistorical Med      ibuprofen (ADVIL;MOTRIN) 200 MG tablet Take 200 mg by mouth every 6 hours as needed for Pain      acetaminophen (TYLENOL) 325 MG tablet Take 650 mg by mouth every 6 hours as needed for Pain. ALLERGIES     has No Known Allergies. FAMILY HISTORY     indicated that his mother is . He indicated that his father is . He indicated that the status of his neg hx is unknown.    family history includes Cancer in his father; Heart Disease in his mother; Prostate Cancer (age of onset: [de-identified]) in his father. SOCIAL HISTORY      reports that he has never smoked.  He has never used smokeless tobacco. He reports that he does not drink alcohol or use drugs. PHYSICAL EXAM     INITIAL VITALS:  height is 5' 8\" (1.727 m) and weight is 215 lb (97.5 kg). His oral temperature is 98.1 °F (36.7 °C). His blood pressure is 136/90 (abnormal) and his pulse is 86. His respiration is 20 and oxygen saturation is 96%. Physical Exam   Constitutional: He is oriented to person, place, and time. He appears well-developed and well-nourished. No distress. HENT:   Head: Normocephalic and atraumatic. Right Ear: External ear normal.   Left Ear: External ear normal.   Nose: Mucosal edema present. Mouth/Throat: Posterior oropharyngeal erythema present. No oropharyngeal exudate or posterior oropharyngeal edema. Eyes: Conjunctivae are normal.   Neck: Normal range of motion. Neck supple. No thyromegaly present. Cardiovascular: Normal rate, regular rhythm and normal heart sounds. No murmur heard. Pulmonary/Chest: Effort normal and breath sounds normal. No respiratory distress. He has no decreased breath sounds. He has no wheezes. He has no rhonchi. He has no rales. He exhibits no tenderness. Abdominal: Soft. Bowel sounds are normal. He exhibits no distension. There is no tenderness. Musculoskeletal: Normal range of motion. He exhibits no edema. Neurological: He is alert and oriented to person, place, and time. No cranial nerve deficit. Skin: Skin is warm and dry. No rash noted. He is not diaphoretic. No erythema. No pallor. Psychiatric: He has a normal mood and affect. His behavior is normal. Judgment and thought content normal.   Nursing note and vitals reviewed. DIFFERENTIAL DIAGNOSIS:   Migraine headache, tension headache, sinusitis, viral illness, cluster headache, paroxysmal hemicrania, supplemental neuralgia.  less likely CVA TIA subarachnoid hemorrhage or meningitis    DIAGNOSTIC RESULTS     EKG: All EKG's are interpreted by the Emergency Department Physician who either signs or Co-signs this chart in the absence of a cardiologist.  EKG interpreted by Nivia Vegas MD:    None    RADIOLOGY: non-plain film images(s) such as CT, Ultrasound and MRI are read by the radiologist.    [address]   Final Result   No acute process. **This report has been created using voice recognition software. It may contain minor errors which are inherent in voice recognition technology. **      Final report electronically signed by Dr. Devan Fry on 7/30/2018 5:00 PM          LABS:   Labs Reviewed   CBC WITH AUTO DIFFERENTIAL - Abnormal; Notable for the following:        Result Value    RBC 4.57 (*)     All other components within normal limits   BASIC METABOLIC PANEL - Abnormal; Notable for the following:     CO2 22 (*)     Glucose 147 (*)     All other components within normal limits   ANION GAP - Abnormal; Notable for the following: Anion Gap 17.0 (*)     All other components within normal limits   SEDIMENTATION RATE   GLOMERULAR FILTRATION RATE, ESTIMATED   OSMOLALITY       EMERGENCY DEPARTMENT COURSE:   Vitals:    Vitals:    07/30/18 1523 07/30/18 1657   BP: (!) 135/91 (!) 136/90   Pulse: 89 86   Resp: 20 20   Temp: 98.1 °F (36.7 °C)    TempSrc: Oral    SpO2: 94% 96%   Weight: 215 lb (97.5 kg)    Height: 5' 8\" (1.727 m)        3:41 PM: The patient was seen and evaluated. 6:07 PM: Discussed results, diagnosis and plan with the patient. Questions were addressed. Disposition and follow-up were agreed upon. Specific discharge instructions explained, including reasons to return to the emergency department. MDM:oropharyngeal  The patient presents to the ED with complaints of headache. The patient was not in any acute distress. The patient's physical exam showed mucosal edema and post  erythema. Within the department, the patient was treated with IV fluids and tylenol. Patient's status improved during the duration of their stay.  Labs and imaging were ordered, and reviewed with the patient. CT head without contrast showed no acute process. I explained my proposed course of treatment, and the patient was amenable to my decision. The patient will be discharged home with Claritin, Augmentin and Flonase, and will need to follow-up with Anuj Alejandre DO in 1 week. The patient will need to come back to the ER if their symptoms worsen, or become more severe in nature. I estimate there is LOW risk for SUBARACHNOID HEMORRHAGE, MENINGITIS, INTRACRANIAL HEMORRHAGE, SUBDURAL OR EPIDURAL HEMATOMA, OR STROKE, thus I consider the discharge disposition reasonable. CRITICAL CARE:   None    CONSULTS:  None    PROCEDURES:  None     FINAL IMPRESSION      1. Acute non intractable tension-type headache    2. Acute non-recurrent sinusitis of other sinus    3.  Chronic allergic rhinitis due to other allergic trigger, unspecified seasonality          DISPOSITION/PLAN   Discharged    PATIENT REFERRED TO:  Dimitri Harper 81  [phone]    Schedule an appointment as soon as possible for a visit in 1 week        DISCHARGE MEDICATIONS:  Discharge Medication List as of 7/30/2018  6:08 PM      START taking these medications    Details   loratadine-pseudoephedrine (CLARITIN-D 12 HOUR) 5-120 MG per extended release tablet Take 1 tablet by mouth 2 times daily for 10 days, Disp-20 tablet, R-0Print      amoxicillin-clavulanate (AUGMENTIN) 875-125 MG per tablet Take 1 tablet by mouth 2 times daily for 10 days, Disp-20 tablet, R-0Print      fluticasone (FLONASE) 50 MCG/ACT nasal spray 2 sprays by Nasal route daily, Disp-1 Bottle, R-0Print             (Please note that portions of this note were completed with a voice recognition program.  Efforts were made to edit the dictations but occasionally words are mis-transcribed.)    Scribe:  Keiry Gordon 7/30/18 3:41 PM Scribing for and in the presence of Esau Jung MD.    Signed by: Asya Cook, 07/31/18 4:55 PM    Provider:  I personally performed the services described in the documentation, reviewed and edited the documentation which was dictated to the scribe in my presence, and it accurately records my words and actions.     Loida Max MD 7/30/18 4:55 PM        Loida Max MD  07/31/18 5633

## 2018-07-30 NOTE — PROGRESS NOTES
Visit Information    Have you changed or started any medications since your last visit including any over-the-counter medicines, vitamins, or herbal medicines? no   Are you having any side effects from any of your medications? -  no  Have you stopped taking any of your medications? Is so, why? -  no    Have you seen any other physician or provider since your last visit? No  Have you had any other diagnostic tests since your last visit? No  Have you been seen in the emergency room and/or had an admission to a hospital since we last saw you? No  Have you had your routine dental cleaning in the past 6 months? yes    Have you activated your [employer] account? If not, what are your barriers? Yes     Patient Care Team:  Evie Schroeder DO as PCP - General (Family Medicine)    Medical History Review  Past Medical, Family, and Social History     Defer to provider.

## 2018-12-03 ENCOUNTER — TELEPHONE (OUTPATIENT)
Dept: FAMILY MEDICINE CLINIC | Age: 63
End: 2018-12-03

## 2018-12-03 DIAGNOSIS — I77.810 MILD DILATION OF ASCENDING AORTA (HCC): Primary | Chronic | ICD-10-CM

## 2018-12-03 DIAGNOSIS — I71.20 THORACIC AORTIC ANEURYSM WITHOUT RUPTURE: ICD-10-CM

## 2018-12-11 ENCOUNTER — TELEPHONE (OUTPATIENT)
Dept: FAMILY MEDICINE CLINIC | Age: 63
End: 2018-12-11

## 2019-01-15 ENCOUNTER — HOSPITAL ENCOUNTER (OUTPATIENT)
Dept: NON INVASIVE DIAGNOSTICS | Age: 64
Discharge: HOME | End: 2019-01-15
Payer: MEDICARE

## 2019-01-15 DIAGNOSIS — I71.20 THORACIC AORTIC ANEURYSM WITHOUT RUPTURE: ICD-10-CM

## 2019-01-15 DIAGNOSIS — I77.810 MILD DILATION OF ASCENDING AORTA (HCC): Chronic | ICD-10-CM

## 2019-01-15 LAB
LV EF: 58 %
LVEF MODALITY: NORMAL

## 2019-01-15 PROCEDURE — 93306 TTE W/DOPPLER COMPLETE: CPT

## 2019-01-16 ENCOUNTER — TELEPHONE (OUTPATIENT)
Dept: FAMILY MEDICINE CLINIC | Age: 64
End: 2019-01-16

## 2019-03-18 ENCOUNTER — OFFICE VISIT (OUTPATIENT)
Dept: FAMILY MEDICINE CLINIC | Age: 64
End: 2019-03-18
Payer: MEDICARE

## 2019-03-18 VITALS
HEIGHT: 68 IN | SYSTOLIC BLOOD PRESSURE: 116 MMHG | DIASTOLIC BLOOD PRESSURE: 78 MMHG | WEIGHT: 223.4 LBS | RESPIRATION RATE: 12 BRPM | TEMPERATURE: 97.6 F | HEART RATE: 76 BPM | BODY MASS INDEX: 33.86 KG/M2

## 2019-03-18 DIAGNOSIS — H81.11 BENIGN PAROXYSMAL POSITIONAL VERTIGO OF RIGHT EAR: Primary | ICD-10-CM

## 2019-03-18 PROCEDURE — 99213 OFFICE O/P EST LOW 20 MIN: CPT | Performed by: FAMILY MEDICINE

## 2019-03-18 PROCEDURE — 1123F ACP DISCUSS/DSCN MKR DOCD: CPT | Performed by: FAMILY MEDICINE

## 2019-03-18 PROCEDURE — 1036F TOBACCO NON-USER: CPT | Performed by: FAMILY MEDICINE

## 2019-03-18 PROCEDURE — 1101F PT FALLS ASSESS-DOCD LE1/YR: CPT | Performed by: FAMILY MEDICINE

## 2019-03-18 PROCEDURE — 3017F COLORECTAL CA SCREEN DOC REV: CPT | Performed by: FAMILY MEDICINE

## 2019-03-18 PROCEDURE — 4040F PNEUMOC VAC/ADMIN/RCVD: CPT | Performed by: FAMILY MEDICINE

## 2019-03-18 PROCEDURE — G8417 CALC BMI ABV UP PARAM F/U: HCPCS | Performed by: FAMILY MEDICINE

## 2019-03-18 PROCEDURE — G8427 DOCREV CUR MEDS BY ELIG CLIN: HCPCS | Performed by: FAMILY MEDICINE

## 2019-03-18 PROCEDURE — G8484 FLU IMMUNIZE NO ADMIN: HCPCS | Performed by: FAMILY MEDICINE

## 2019-03-18 RX ORDER — MECLIZINE HYDROCHLORIDE 25 MG/1
25 TABLET ORAL 3 TIMES DAILY PRN
Qty: 30 TABLET | Refills: 0 | Status: SHIPPED | OUTPATIENT
Start: 2019-03-18 | End: 2019-03-28

## 2019-03-25 ENCOUNTER — HOSPITAL ENCOUNTER (EMERGENCY)
Age: 64
Discharge: HOME | End: 2019-03-25
Payer: MEDICARE

## 2019-03-25 ENCOUNTER — APPOINTMENT (OUTPATIENT)
Dept: GENERAL RADIOLOGY | Age: 64
End: 2019-03-25
Payer: MEDICARE

## 2019-03-25 VITALS
RESPIRATION RATE: 20 BRPM | SYSTOLIC BLOOD PRESSURE: 136 MMHG | DIASTOLIC BLOOD PRESSURE: 76 MMHG | HEART RATE: 102 BPM | TEMPERATURE: 98.1 F | OXYGEN SATURATION: 96 %

## 2019-03-25 DIAGNOSIS — J18.9 PNEUMONIA DUE TO ORGANISM: Primary | ICD-10-CM

## 2019-03-25 LAB
FLU A ANTIGEN: NEGATIVE
FLU B ANTIGEN: NEGATIVE
GROUP A STREP CULTURE, REFLEX: NEGATIVE
REFLEX THROAT C + S: NORMAL

## 2019-03-25 PROCEDURE — 2709999900 HC NON-CHARGEABLE SUPPLY

## 2019-03-25 PROCEDURE — 99283 EMERGENCY DEPT VISIT LOW MDM: CPT

## 2019-03-25 PROCEDURE — 6370000000 HC RX 637 (ALT 250 FOR IP): Performed by: NURSE PRACTITIONER

## 2019-03-25 PROCEDURE — 87880 STREP A ASSAY W/OPTIC: CPT

## 2019-03-25 PROCEDURE — 87070 CULTURE OTHR SPECIMN AEROBIC: CPT

## 2019-03-25 PROCEDURE — 87804 INFLUENZA ASSAY W/OPTIC: CPT

## 2019-03-25 PROCEDURE — 71046 X-RAY EXAM CHEST 2 VIEWS: CPT

## 2019-03-25 RX ORDER — ALBUTEROL SULFATE 90 UG/1
2 AEROSOL, METERED RESPIRATORY (INHALATION) EVERY 6 HOURS PRN
Status: DISCONTINUED | OUTPATIENT
Start: 2019-03-25 | End: 2019-03-26 | Stop reason: HOSPADM

## 2019-03-25 RX ORDER — AMOXICILLIN AND CLAVULANATE POTASSIUM 875; 125 MG/1; MG/1
1 TABLET, FILM COATED ORAL 2 TIMES DAILY
Qty: 20 TABLET | Refills: 0 | Status: SHIPPED | OUTPATIENT
Start: 2019-03-25 | End: 2019-04-04

## 2019-03-25 RX ORDER — AMOXICILLIN AND CLAVULANATE POTASSIUM 875; 125 MG/1; MG/1
1 TABLET, FILM COATED ORAL EVERY 12 HOURS SCHEDULED
Status: DISCONTINUED | OUTPATIENT
Start: 2019-03-25 | End: 2019-03-26 | Stop reason: HOSPADM

## 2019-03-25 RX ORDER — PREDNISONE 10 MG/1
50 TABLET ORAL DAILY
Qty: 25 TABLET | Refills: 0 | Status: SHIPPED | OUTPATIENT
Start: 2019-03-25 | End: 2019-03-30

## 2019-03-25 RX ADMIN — AMOXICILLIN AND CLAVULANATE POTASSIUM 1 TABLET: 875; 125 TABLET, FILM COATED ORAL at 22:50

## 2019-03-25 ASSESSMENT — ENCOUNTER SYMPTOMS
BACK PAIN: 0
VOMITING: 0
EYE REDNESS: 0
DIARRHEA: 0
ABDOMINAL PAIN: 0
SORE THROAT: 0
WHEEZING: 0
NAUSEA: 0
COUGH: 1
EYE DISCHARGE: 0
RHINORRHEA: 0
SHORTNESS OF BREATH: 0

## 2019-03-27 LAB — THROAT/NOSE CULTURE: NORMAL

## 2019-04-01 ENCOUNTER — HOSPITAL ENCOUNTER (OUTPATIENT)
Dept: PHYSICAL THERAPY | Age: 64
Setting detail: THERAPIES SERIES
Discharge: HOME | End: 2019-04-01
Payer: MEDICARE

## 2019-04-01 PROCEDURE — 95992 CANALITH REPOSITIONING PROC: CPT

## 2019-04-01 PROCEDURE — 97161 PT EVAL LOW COMPLEX 20 MIN: CPT

## 2019-04-01 NOTE — PROGRESS NOTES
** PLEASE SIGN, DATE AND TIME CERTIFICATION BELOW AND RETURN TO [facility] OUTPATIENT REHABILITATION (FAX #: [phone]). ATTEST/CO-SIGN IF ACCESSING VIA [employer]. THANK YOU.**    I certify that I have examined the patient below and determined that Physical Medicine and Rehabilitation service is necessary and that I approve the established plan of care for up to 90 days or as specifically noted. Attestation, signature or co-signature of physician indicates approval of certification requirements.    ________________________ ____________ __________  Physician Signature   Date   Time       Anmol Emiliewallace     Time In: 2710  Time Out: 8594  Minutes: 35  Timed Code Treatment Minutes: 10 Minutes             Date: 2019  Patient Name: Nathaly Gregg,  Gender:  male        CSN: [id number]   : 1955  (70 y.o.)  Referral Date : 19     Referring Practitioner: Gayle Hassan DO      Diagnosis: H81.11 (ICD-10-CM) - Benign paroxysmal positional vertigo of right ear  Treatment Diagnosis: Dizziness  Additional Pertinent Hx: Nothing per patient       General:  PT Visit Information  Onset Date: 19  PT Insurance Information: MEDICARE  -Patient has unlimited visits based on medical necessity. Aquatics and modalities are covered except ionto and hot/cold packs. Total # of Visits to Date: 1  Progress Note Due Date: 19  Progress Note Counter: 1/10 for PN                        See Medical History Questionnaire for information related to allergies and medications. Subjective:  Chart Reviewed: Yes  Patient assessed for rehabilitation services?: Yes  Family / Caregiver Present: No  Comments: Follow up with doctor if needed. Other (Comment): Vestibular Rehab     Subjective: Patient presents to therapy with dizziness. Reports 3-4 weeks ago had gradual onset of dizziness.   Patient reports is feeling better, but still having some dizziness. Reports it is not preventing him from doing anything. 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 Objective  Overall Orientation Status: Within Normal Limits             Vision: Impaired(Wears glasses)    Hearing: Within functional limits      Exercises  Exercise 1: Modified Vertebral Artery=negative  Exercise 2: Eyes Tracking well  Exercise 3: Tanvir Hallpike=positive symptoms on right side. Nystagmus present and lasting ~15 seconds. Treated with Epley Maneuver. Patient educated on 24 hour precautions. Exercise 4: Next session: vestibular recheck, gaze stabilization exercises as needed            Activity Tolerance:  Activity Tolerance: Patient Tolerated treatment well    Assessment: Body structures, Functions, Activity limitations: Decreased functional mobility , Decreased ADL status, Vestibular Impairment  Assessment: Patient educated on 24 hour precautions, but therapy did see patient looking down several times before leaving session.     Prognosis: Good  REQUIRES PT FOLLOW UP: Yes  Discharge Recommendations: Continue to assess pending progress    Patient Education:  Patient Education: Patient educated on POC and HEP                   Plan:  Times per week: 1-2 times per week  Plan weeks: Up to 12 weeks  Specific instructions for Next Treatment: Vestibular recheck, Gaze stabilization exercises  Current Treatment Recommendations: Balance Training, Vestibular Rehab, Home Exercise Program  Plan Comment: POC initiated    History: Personal factors or comorbidities that impact plan of care - Low Complexity: no personal factors or comorbidities    Examination: Body structures and functions, activity limitations, participation restrictions; using standardized tests and measures - Low Complexity: 1-2 body structures and functional, activity limitation and/or participation restrictions. See restrictions and objective section above for details. Clinical Presentation: Low - Stable and Uncomplicated: Dizziness onset from 3-4 weeks ago. No known cause. Decision Making: Low Complexity due to Able to participate fully in session and in goal setting. .  Decision making was based on patient assessment and decision making process in determining plan of care and establishing reasonable expectations for measurable functional outcomes. Evaluation Complexity: Based on the findings of patient history, examination, clinical presentation, and decision making during this evaluation, the evaluation of Judith Muniz  is of low complexity. Goals:  Patient goals : \"Decrease dizziness. \"    Short term goals  Time Frame for Short term goals: 4 weeks  Short term goal 1: Patient to report no longer having any sensation of dizziness to assist with getting in and out of bed. Short term goal 2: Improved score of Dizziness Handicap Inventory to 2/100 (was 6/100) to assist with safety at home. Long term goals  Time Frame for Long term goals : Up to 12 weeks  Long term goal 1: Independent with 24 hour precautions to assist with decreasing dizziness.     Ling Spencer, Yosef Mcqueen

## 2019-04-08 ENCOUNTER — HOSPITAL ENCOUNTER (OUTPATIENT)
Dept: PHYSICAL THERAPY | Age: 64
Setting detail: THERAPIES SERIES
Discharge: HOME | End: 2019-04-08
Payer: MEDICARE

## 2019-04-08 NOTE — PROGRESS NOTES
[facility]  PHYSICAL THERAPY MISSED TREATMENT NOTE  OUTPATIENT REHABILITATION    Date: 2019  Patient Name: Ratna Vora        MRN: [id number]   : 1955  (70 y.o.)  Gender: male           PT Visit Information  No Show: 1    REASON FOR MISSED TREATMENT:  No Show/No Call. Patient reported he is feeling pretty good and wants to be on hold. Patient on hold x1 week and patient is to contact department within 1 week if symptoms get worse and needs to return to therapy. Evan Logan  [address], [address] [facility]

## 2019-04-29 NOTE — DISCHARGE SUMMARY
[facility]    Patient Name: Taylor Curry        CSN: [id number]   YOB: 1955  Gender: male  Referring Practitioner: Cely Yepez DO  Diagnosis: H81.11 (ICD-10-CM) - Benign paroxysmal positional vertigo of right ear    Patient is discharged from Physical Therapy services at this time. See last note for details related to results of therapy and goal achievement. Reason for discharge:  Patient attended evaluation on 4/1/19. Patient then no-showed for appointment after on 4/8/19. Patient was called and patient reported was feeling better. Decided to be placed on hold 1 week and patient to call if symptoms worsened. Patient did not call and is being discharged today. Please see previous notes for further details. Yeimi Mathews  [address], [address] [facility]

## 2019-05-08 ENCOUNTER — TELEPHONE (OUTPATIENT)
Dept: FAMILY MEDICINE CLINIC | Age: 64
End: 2019-05-08

## 2019-05-08 DIAGNOSIS — I71.40 ABDOMINAL AORTIC ANEURYSM (AAA) WITHOUT RUPTURE: Primary | ICD-10-CM

## 2019-05-08 NOTE — TELEPHONE ENCOUNTER
Will get US of abdominal aorta    ASSESSMENT & PLAN  1. Abdominal aortic aneurysm (AAA) without rupture ([address]    - US ABDOMINAL AORTA LIMITED;  Future

## 2019-05-08 NOTE — TELEPHONE ENCOUNTER
Is he talking about his TAA? We've not tested him for AAA (we screen men 65-75 who have smoked per current guidelines). However, if he wants, I can order an US to screen for AAA  We did echo in JAN 2019 for his TAA and was mild and stable with plans to repeat JAN 2020  Let me know if questions, thanks!     Future Appointments   Date Time Provider Orville Leyva   6/11/2019  3:00 PM Edwige Delgado, [address] [place] [address]

## 2019-05-09 ENCOUNTER — TELEPHONE (OUTPATIENT)
Dept: FAMILY MEDICINE CLINIC | Age: 64
End: 2019-05-09

## 2019-05-09 NOTE — TELEPHONE ENCOUNTER
Pt scheduled for US abd aorta at [facility] on 5/20/19 at 8:00 am, pt to be in main radiology at 7:30 am.  NPO 8 hrs, and Fat Free supper night before. Pt informed.

## 2019-06-10 NOTE — PROGRESS NOTES
Chief Complaint   Patient presents with    Follow-up     Chronic issues    Other     difficulty  holding urine for last 6 months - no wekness in stream        History obtained from the patient. SUBJECTIVE:  Erik Rangel is a 75 y.o. male that presents today for     -01. Mild Dilated ascending aorta: found incidentally on echo JAN 2018. No CP/SOB. Repeat echo JAN 2019 is stable. Pt would like screened for AAA, mother had AAA. No abd pain.     -02. HLD: diet controlled; due for labs. Working on lifestyle changes.     -03. s/p BCC excision LAST VISIT: under went excision for several BCC about a month ago on R shoulder and scalp. Had post infection. But treated and is doing better. States out. Derm wanted him to f/u. He's not sure why.      UPDATE TODAY: has another BCC on nose, is doing to have removed in July. No pain.       -04. Bipolar INITIAL PRESENTATION: See's psych in [place], has been stable on depakote for many years. Denies manic or depressive sxs. See's therapist twice per month. Plans to con't to see psyc once per year, but would like me to fill depakote for him. Denies SI/HI.     UPDATE PRIOR Visit: Pt here feeling manic with high energy and lack of sleep. Denies SI/HI or risk taking behavior. At last visit pt related he was only taking depakote 250mg ER once daily. However, today he relates that he should actually be taking 1000mg daily, and was on that until he saw me back in May. Over the last month is when these sxs have started. Also relates that his psych in [place] has left practice and so is w/o psych at the moment. Working on getting another. Still seeing therapist regularly.      UPDATE PRIOR VISIT: Moods feel more stable, still having some issues with sleep. Anxiety is less, feeling less pressured. Denies SI/HI. Has not made any headway into seeing psyciatry. Still seeing therapy.  Did depakote level today, still pending.      UPDATE PRIOr VISIT: states has had some increased moodiness. Still not seeing psych. Not sleeping well over the last few months. No risk taking behaviors but is overall feeling manic. Admits to sporadically taking depakote.  Denies SI/HI. Had an incident at work that apparently has caused some issues prompting him to consider resuming his medications. He has an intake with Kim upcoming and is continuing with his counseling.      UPDATE PRIOR VISIT: seeing psych NP now. Apparently was seeing before, but just for therapy. He is asking if she can assume medical treatment for his bipolar. Manic sxs improved with starting of depakote. He did not do labs ordered last visit.      UPDATE LAST VISIT: stable, doing well. Following with pysch.  meds working well. No SI/HI     UPDATE TODAY: still following with psych. On depakokte; working well. Denies SI/HI      -05. Dizziness LAST VISIT:    HPI: for the last wk having on and off dizziness. None at rest. Notices when gets up in AM or rolls over in bed. Lasts sec to min and then resolves. No injury or trauma. No tinnitus. No worsening hearing loss. No headaches. Never had before. Feels off balance/spinning. No light headedness. No syncope or near syncope. Patient describes symptoms as vertigo - patient feels like room is spinning  Provoking factors- moving, rolling over, turning head  Alleviating factors - rest, not moving  Frequency of symptoms - when moves. Duration of events - as above. History of trauma - No   Nausea and vomiting - No  History of Meniers disease, BPPV, or labrinthitis - No  Medications that can cause vertigo -  No  History of alcohol consumption - No    UPDATE TODAY: sxs improved. Completed vestibular rehabi. sxs much better. Feeling well.       -06. LUTS: over the last 6 months having increased urgency sxs. Once has the urge to go has trouble holding his urine. No dec stream. No hematuria. No trouble starting/stopping strem  No weak stream  No dysuria  No inc frequency.    Dad had prostate CA in his [de-identified]      Age/Gender Health Maintenance    Lipid -   No components found for: CHLPL  Lab Results   Component Value Date    TRIG 84 03/30/2018    TRIG 102 01/11/2017    TRIG 102 11/12/2015     Lab Results   Component Value Date    HDL 42 03/30/2018    HDL 49 01/11/2017    HDL 50 11/12/2015     Lab Results   Component Value Date    LDLCALC 120 03/30/2018    LDLCALC 122 01/11/2017    LDLCALC 109 11/12/2015     Lab Results   Component Value Date    LABVLDL 17 03/30/2018    LABVLDL 20 11/12/2015       DM Screen -   Lab Results   Component Value Date    GLUCOSE 147 07/30/2018    GLUCOSE 97 03/30/2018       Colon Cancer Screening - NEG June 2015, repeat 10 years    Tetanus - UTD MAY 2014  Influenza Vaccine - Candidate FALL 2019  Pneumonia Vaccine - UTD PCV 13 (June 2018)/PPV23 in June 2019  Zoster - to get at pharmacy    PSA testing discussion - NOV 2015  PSA, Ultrasensitive 0.78 <4.0 ng/ml Final     Lab Results   Component Value Date    PSA 1.31 01/11/2017    PSA 2.83 11/01/2014    PSA 1.01 02/19/2013     Component      Latest Ref Rng & Units 3/30/2018          11:00 AM   PSA, Ultrasensitive      <=4.00 ng/mL 1.180       AAA Screening - non-smoker      Current Outpatient Medications   Medication Sig Dispense Refill    tamsulosin (FLOMAX) 0.4 MG capsule Take 1 capsule by mouth daily 90 capsule 1    traZODone (DESYREL) 100 MG tablet Take 100 mg by mouth nightly      divalproex (DEPAKOTE ER) 250 MG extended release tablet Take 2 tablets by mouth daily (Patient taking differently: Take 500 mg by mouth three times daily ) 60 tablet 5    acetaminophen (TYLENOL) 325 MG tablet Take 650 mg by mouth every 6 hours as needed for Pain. No current facility-administered medications for this visit.       Orders Placed This Encounter   Medications    tamsulosin (FLOMAX) 0.4 MG capsule     Sig: Take 1 capsule by mouth daily     Dispense:  90 capsule     Refill:  1         All medications reviewed and reconciled, including OTC and herbal medications. Updated list given to patient. Patient Active Problem List   Diagnosis    Bipolar 1 disorder ([address]    History of basal cell carcinoma    Hyperlipidemia    Insomnia    Eczema    History of skin cancer    Primary osteoarthritis of left hand    History of compression fracture of lumbar spine, L1    History of small bowel obstruction    Mild dilation of ascending aorta (HCC)    BPH (benign prostatic hyperplasia)       Past Medical History:   Diagnosis Date    Bipolar 1 disorder (HCC)     BPH (benign prostatic hyperplasia)     Eczema     Hearing loss of both ears     Hearing aids    Hepatic cyst     Benign (see imaging)    History of basal cell carcinoma     Skin - left shoulder     History of compression fracture of lumbar spine, L1     While in the [employer] in his 17's    History of skin cancer 8/25/2015    History of small bowel obstruction     Many years ago    Hyperlipidemia     Diet Controlled    Insomnia     Mild dilation of ascending aorta (HCC)     Primary osteoarthritis of left hand 8/27/2015       Past Surgical History:   Procedure Laterality Date    SKIN BIOPSY      SKIN CANCER EXCISION      TONSILLECTOMY AND ADENOIDECTOMY      WISDOM TOOTH EXTRACTION         No Known Allergies    Social History     Tobacco Use    Smoking status: Never Smoker    Smokeless tobacco: Never Used   Substance Use Topics    Alcohol use: No       Family History   Problem Relation Age of Onset    Heart Disease Mother     Cancer Father     Prostate Cancer Father [de-identified]    Colon Cancer Neg Hx        I have reviewed the patient's past medical history, past surgical history, allergies, medications, social and family history and I have made updates where appropriate.       Review of Systems  Positive responses are highlighted in bold    Constitutional:  Fever, Chills, Night Sweats, Fatigue, Unexpected changes in weight  HENT:  Ear pain, Tinnitus, Nosebleeds, Trouble swallowing, Hearing loss, Sore throat  Cardiovascular:  Chest Pain, Palpitations, Orthopnea, Paroxysmal Nocturnal Dyspnea  Respiratory:  Cough, Wheezing, Shortness of breath, Chest tightness, Apnea  Gastrointestinal:  Nausea, Vomiting, Diarrhea, Constipation, Heartburn, Blood in stool  Genitourinary:  Difficulty or painful urination, Flank pain, Change in frequency, Urgency  Skin:  Color change, Rash, Itching, Wound  Musculoskeletal:  Joint pain, Back pain, Gait problems, Joint swelling, Myalgias  Neurological:  Dizziness, Headaches, Presyncope, Numbness, Seizures, Tremors  Endocrine:  Heat Intolerance, Cold Intolerance, Polydipsia, Polyphagia, Polyuria      PHYSICAL EXAM:  Vitals:    06/11/19 1511   BP: 110/72   Pulse: 62   Resp: 12   Temp: 98.2 °F (36.8 °C)   TempSrc: Oral   Weight: 222 lb (100.7 kg)   Height: 5' 7\" (1.702 m)     Body mass index is 34.77 kg/m². Pain Score:   0 - No pain    VS Reviewed  General Appearance: A&O x 3, No acute distress,well developed and well- nourished  Eyes: pupils equal, round, and reactive to light, extraocular eye movements intact, conjunctivae and eye lids without erythema  ENT: external ear and ear canal clear bilaterally, TMs intact and regular, nose without deformity, nasal mucosa and turbinates normal without polyps, oropharynx normal, dentition is normal for age  Neck: supple and non-tender without mass, no thyromegaly or thyroid nodules, no cervical lymphadenopathy  Pulmonary/Chest: clear to auscultation bilaterally- no wheezes, rales or rhonchi, normal air movement, no respiratory distress or retractions  Cardiovascular: S1 and S2 auscultated w/ RRR. No murmurs, rubs, clicks, or gallops, distal pulses intact. Abdomen: soft, non-tender, non-distended, bowl sounds physiologic,  no rebound or guarding, no masses or hernias noted. Liver and spleen without enlargement. Extremities: no cyanosis, clubbing or edema of the lower extremities.    Skin: warm and dry, no rash or erythema. Excisions healing well.  Exam: genitals normal without hernia or inguinal adenopathy, prostate + enlarged, symmetric without nodules. Non-tender. Results for POC orders placed in visit on 06/11/19   POCT Urinalysis no Micro   Result Value Ref Range    Color, UA YELLOW     Clarity, UA CLEAR     Glucose, UA POC NEGATIVE     Bilirubin, UA NEGATIVE     Ketones, UA TRACE     Spec Grav, UA >=1.030     Blood, UA POC NEGATIVE     pH, UA 6.0     Protein, UA POC NEGATIVE     Urobilinogen, UA 0.2     Leukocytes, UA NEGATIVE     Nitrite, UA NEGATIVE        ECHO 15 TONYA 2019   Conclusions      Summary   Technically difficult examination.   Normal left ventricle size and systolic function. Ejection fraction was   estimated at 55 to 60 %. There were no regional left ventricular wall   motion abnormalities and wall thickness was within normal limits.   The left atrium is Mild to moderate dilated.   Aortic aneurysm noted in the ascending aorta .   Aortic aneurysm measures 4.3 cm . ASSESSMENT & PLAN  1. Mild dilation of ascending aorta (HCC)    Very mild  Will monitor  Echo in  [facility] in call-back cue    - CBC Auto Differential; Future  - Comprehensive Metabolic Panel; Future  - Lipid Panel; Future  - TSH with Reflex; Future    2. Mixed hyperlipidemia    Stable  Diet controlled  Due for labs    - Comprehensive Metabolic Panel; Future  - Lipid Panel; Future  - TSH with Reflex; Future    3. History of basal cell carcinoma    F/u with derm as planned    - CBC Auto Differential; Future    4. Bipolar 1 disorder (HCC)    Stable  con't depakote and psych f/u    - CBC Auto Differential; Future  - Comprehensive Metabolic Panel; Future    5. Benign paroxysmal positional vertigo of right ear    Improved  F/u if recurs    6.  Benign prostatic hyperplasia with lower urinary tract symptoms, symptom details unspecified    Hx and exam most c/w BPH with LUTS  UA neg  Send for culture  Trial [facility]  Will call him in 4 wks and see how sxs are doing    - POCT Urinalysis no Micro  - Urine Culture; Future  - tamsulosin (FLOMAX) 0.4 MG capsule; Take 1 capsule by mouth daily  Dispense: 90 capsule; Refill: 1    7. Special screening for malignant neoplasm of prostate    Discussed prostate screening guidelines and with shared decision making, he elects to proceed with psa    - Psa screening; Future    8. Need for hepatitis C screening test    - Hepatitis C Antibody; Future    9. Need for pneumococcal vaccination    - Pneumococcal polysaccharide vaccine 23-valent greater than or equal to 1yo subcutaneous/IM      DISPOSITION    Return in about 1 year (around 6/11/2020) for follow-up on chronic medical conditions, sooner as needed. Asha Boucher released without restrictions. PATIENT COUNSELING    Nick received counseling on the following healthy behaviors: nutrition, exercise and medication adherence    Patient given educational materials on: See Attached    Barriers to learning and self management: none    Discussed use, benefit, and side effects of prescribed medications. Barriers to medication compliance addressed. All patient questions answered. Pt voiced understanding.        Electronically signed by Shima Thao DO on 6/11/2019 at 5:32 PM

## 2019-06-11 ENCOUNTER — OFFICE VISIT (OUTPATIENT)
Dept: FAMILY MEDICINE CLINIC | Age: 64
End: 2019-06-11
Payer: MEDICARE

## 2019-06-11 ENCOUNTER — NURSE ONLY (OUTPATIENT)
Dept: LAB | Age: 64
End: 2019-06-11

## 2019-06-11 VITALS
HEIGHT: 67 IN | SYSTOLIC BLOOD PRESSURE: 110 MMHG | BODY MASS INDEX: 34.84 KG/M2 | DIASTOLIC BLOOD PRESSURE: 72 MMHG | HEART RATE: 62 BPM | RESPIRATION RATE: 12 BRPM | TEMPERATURE: 98.2 F | WEIGHT: 222 LBS

## 2019-06-11 DIAGNOSIS — Z12.5 SPECIAL SCREENING FOR MALIGNANT NEOPLASM OF PROSTATE: ICD-10-CM

## 2019-06-11 DIAGNOSIS — E78.2 MIXED HYPERLIPIDEMIA: Chronic | ICD-10-CM

## 2019-06-11 DIAGNOSIS — I77.810 MILD DILATION OF ASCENDING AORTA (HCC): ICD-10-CM

## 2019-06-11 DIAGNOSIS — H81.11 BENIGN PAROXYSMAL POSITIONAL VERTIGO OF RIGHT EAR: ICD-10-CM

## 2019-06-11 DIAGNOSIS — Z23 NEED FOR PNEUMOCOCCAL VACCINATION: ICD-10-CM

## 2019-06-11 DIAGNOSIS — Z11.59 NEED FOR HEPATITIS C SCREENING TEST: ICD-10-CM

## 2019-06-11 DIAGNOSIS — Z85.828 HISTORY OF BASAL CELL CARCINOMA: Chronic | ICD-10-CM

## 2019-06-11 DIAGNOSIS — F31.9 BIPOLAR 1 DISORDER (HCC): ICD-10-CM

## 2019-06-11 DIAGNOSIS — N40.1 BENIGN PROSTATIC HYPERPLASIA WITH LOWER URINARY TRACT SYMPTOMS, SYMPTOM DETAILS UNSPECIFIED: ICD-10-CM

## 2019-06-11 DIAGNOSIS — I77.810 MILD DILATION OF ASCENDING AORTA (HCC): Primary | ICD-10-CM

## 2019-06-11 LAB
ALBUMIN SERPL-MCNC: 4.3 G/DL (ref 3.5–5.1)
ALP BLD-CCNC: 56 U/L (ref 38–126)
ALT SERPL-CCNC: 15 U/L (ref 11–66)
ANION GAP SERPL CALCULATED.3IONS-SCNC: 13 MEQ/L (ref 8–16)
AST SERPL-CCNC: 22 U/L (ref 5–40)
BASOPHILS # BLD: 0.5 %
BASOPHILS ABSOLUTE: 0 THOU/MM3 (ref 0–0.1)
BILIRUB SERPL-MCNC: 0.4 MG/DL (ref 0.3–1.2)
BILIRUBIN, POC: NEGATIVE
BLOOD URINE, POC: NEGATIVE
BUN BLDV-MCNC: 11 MG/DL (ref 7–22)
CALCIUM SERPL-MCNC: 9.4 MG/DL (ref 8.5–10.5)
CHLORIDE BLD-SCNC: 102 MEQ/L (ref 98–111)
CHOLESTEROL, TOTAL: 161 MG/DL (ref 100–199)
CLARITY, POC: CLEAR
CO2: 28 MEQ/L (ref 23–33)
COLOR, POC: YELLOW
CREAT SERPL-MCNC: 0.7 MG/DL (ref 0.4–1.2)
EOSINOPHIL # BLD: 2.7 %
EOSINOPHILS ABSOLUTE: 0.2 THOU/MM3 (ref 0–0.4)
ERYTHROCYTE [DISTWIDTH] IN BLOOD BY AUTOMATED COUNT: 13.2 % (ref 11.5–14.5)
ERYTHROCYTE [DISTWIDTH] IN BLOOD BY AUTOMATED COUNT: 45.1 FL (ref 35–45)
GFR SERPL CREATININE-BSD FRML MDRD: > 90 ML/MIN/1.73M2
GLUCOSE BLD-MCNC: 89 MG/DL (ref 70–108)
GLUCOSE URINE, POC: NEGATIVE
HCT VFR BLD CALC: 44.1 % (ref 42–52)
HDLC SERPL-MCNC: 43 MG/DL
HEMOGLOBIN: 14 GM/DL (ref 14–18)
HEPATITIS C ANTIBODY: NEGATIVE
IMMATURE GRANS (ABS): 0.03 THOU/MM3 (ref 0–0.07)
IMMATURE GRANULOCYTES: 0.4 %
KETONES, POC: NORMAL
LDL CHOLESTEROL CALCULATED: 105 MG/DL
LEUKOCYTE EST, POC: NEGATIVE
LYMPHOCYTES # BLD: 30.9 %
LYMPHOCYTES ABSOLUTE: 2.3 THOU/MM3 (ref 1–4.8)
MCH RBC QN AUTO: 29.9 PG (ref 26–33)
MCHC RBC AUTO-ENTMCNC: 31.7 GM/DL (ref 32.2–35.5)
MCV RBC AUTO: 94 FL (ref 80–94)
MONOCYTES # BLD: 6.7 %
MONOCYTES ABSOLUTE: 0.5 THOU/MM3 (ref 0.4–1.3)
NITRITE, POC: NEGATIVE
NUCLEATED RED BLOOD CELLS: 0 /100 WBC
PH, POC: 6
PLATELET # BLD: 254 THOU/MM3 (ref 130–400)
PMV BLD AUTO: 10.6 FL (ref 9.4–12.4)
POTASSIUM SERPL-SCNC: 4.7 MEQ/L (ref 3.5–5.2)
PROSTATE SPECIFIC ANTIGEN: 1.25 NG/ML (ref 0–1)
PROTEIN, POC: NEGATIVE
RBC # BLD: 4.69 MILL/MM3 (ref 4.7–6.1)
SEG NEUTROPHILS: 58.8 %
SEGMENTED NEUTROPHILS ABSOLUTE COUNT: 4.3 THOU/MM3 (ref 1.8–7.7)
SODIUM BLD-SCNC: 143 MEQ/L (ref 135–145)
SPECIFIC GRAVITY, POC: >=1.03
TOTAL PROTEIN: 7.3 G/DL (ref 6.1–8)
TRIGL SERPL-MCNC: 65 MG/DL (ref 0–199)
TSH SERPL DL<=0.05 MIU/L-ACNC: 2.55 UIU/ML (ref 0.4–4.2)
UROBILINOGEN, POC: 0.2
WBC # BLD: 7.3 THOU/MM3 (ref 4.8–10.8)

## 2019-06-11 PROCEDURE — G8427 DOCREV CUR MEDS BY ELIG CLIN: HCPCS | Performed by: FAMILY MEDICINE

## 2019-06-11 PROCEDURE — 81002 URINALYSIS NONAUTO W/O SCOPE: CPT | Performed by: FAMILY MEDICINE

## 2019-06-11 PROCEDURE — 4040F PNEUMOC VAC/ADMIN/RCVD: CPT | Performed by: FAMILY MEDICINE

## 2019-06-11 PROCEDURE — 3017F COLORECTAL CA SCREEN DOC REV: CPT | Performed by: FAMILY MEDICINE

## 2019-06-11 PROCEDURE — G0009 ADMIN PNEUMOCOCCAL VACCINE: HCPCS | Performed by: FAMILY MEDICINE

## 2019-06-11 PROCEDURE — 1036F TOBACCO NON-USER: CPT | Performed by: FAMILY MEDICINE

## 2019-06-11 PROCEDURE — 1123F ACP DISCUSS/DSCN MKR DOCD: CPT | Performed by: FAMILY MEDICINE

## 2019-06-11 PROCEDURE — G8417 CALC BMI ABV UP PARAM F/U: HCPCS | Performed by: FAMILY MEDICINE

## 2019-06-11 PROCEDURE — 90732 PPSV23 VACC 2 YRS+ SUBQ/IM: CPT | Performed by: FAMILY MEDICINE

## 2019-06-11 PROCEDURE — 99214 OFFICE O/P EST MOD 30 MIN: CPT | Performed by: FAMILY MEDICINE

## 2019-06-11 RX ORDER — TAMSULOSIN HYDROCHLORIDE 0.4 MG/1
0.4 CAPSULE ORAL DAILY
Qty: 90 CAPSULE | Refills: 1 | Status: SHIPPED | OUTPATIENT
Start: 2019-06-11 | End: 2020-06-11

## 2019-06-11 NOTE — PROGRESS NOTES
Immunization(s) given during visit:    Immunizations     Name Date Dose Route    Pneumococcal Polysaccharide ([id number]) 6/11/2019 0.5 mL Intramuscular    Site: Deltoid- Left    Lot: [id number]    NDC: 8980-0774-24          Most recent Vaccine Information Sheet dated 04/24/15   given to pt

## 2019-06-11 NOTE — PATIENT INSTRUCTIONS
Patient Education        Benign Prostatic Hyperplasia: Care Instructions  Your Care Instructions    Benign prostatic hyperplasia, or BPH, is an enlarged prostate gland. The prostate is a small gland that makes some of the fluid in semen. Prostate enlargement happens to almost all men as they age. It is usually not serious. BPH does not cause prostate cancer. As the prostate gets bigger, it may partly block the flow of urine. You may have a hard time getting a urine stream started or completely stopped. BPH can cause dribbling. You may have a weak urine stream, or you may have to urinate more often than you used to, especially at night. Most men find these problems easy to manage. You do not need treatment unless your symptoms bother you a lot or you have other problems, such as bladder infections or stones. In these cases, medicines may help. Surgery is not needed unless the urine flow is blocked or the symptoms do not get better with medicine. Follow-up care is a key part of your treatment and safety. Be sure to make and go to all appointments, and call your doctor if you are having problems. It's also a good idea to know your test results and keep a list of the medicines you take. How can you care for yourself at home? · Take plenty of time to urinate. Try to relax. · Try \"double voiding. \" Urinate as much you can, relax for a few moments, and then try to urinate again. · Sit on the toilet to urinate. · Read or think of other things while you are waiting. · Turn on a faucet, or try to picture running water. Some men find that this helps get their urine flowing. · If dribbling is a problem, wash your penis daily to avoid skin irritation and infection. · Avoid caffeine and alcohol. These drinks will increase how often you need to urinate. Spread your fluid intake throughout the day. If the urge to urinate often wakes you at night, limit your fluid intake in the evening.  Urinate right before you go to bed.  · Many over-the-counter cold and allergy medicines can make the symptoms of BPH worse. Avoid antihistamines, decongestants, and allergy pills, if you can. Read the warnings on the package. · If you take any prescription medicines, especially tranquilizers or antidepressants, ask your doctor or pharmacist whether they can cause urination problems. There may be other medicines you can use that do not cause urinary problems. · Be safe with medicines. Take your medicines exactly as prescribed. Call your doctor if you think you are having a problem with your medicine. When should you call for help? Call your doctor now or seek immediate medical care if:    · You cannot urinate at all.     · You have symptoms of a urinary infection. For example:  ? You have blood or pus in your urine. ? You have pain in your back just below your rib cage. This is called flank pain. ? You have a fever, chills, or body aches. ? It hurts to urinate. ? You have groin or belly pain.    Watch closely for changes in your health, and be sure to contact your doctor if:    · It hurts when you ejaculate.     · Your urinary problems get a lot worse or bother you a lot. Where can you learn more? Go to [email or web address] and sign in to your [employer] account. Enter O653 in the Site Tour box to learn more about \"Benign Prostatic Hyperplasia: Care Instructions. \"     If you do not have an account, please click on the \"Sign Up Now\" link. Current as of: September 26, 2018  Content Version: 12.0  © 3168-3725 [employer]wise, Incorporated. Care instructions adapted under license by [address] If you have questions about a medical condition or this instruction, always ask your healthcare professional. [place] [address] any warranty or liability for your use of this information.

## 2019-06-12 ENCOUNTER — TELEPHONE (OUTPATIENT)
Dept: FAMILY MEDICINE CLINIC | Age: 64
End: 2019-06-12

## 2019-06-12 NOTE — TELEPHONE ENCOUNTER
----- Message from Cheri Treadwell DO sent at 6/11/2019  9:18 PM EDT -----  Please let pt know that labs are stable and appropriate. Let me know if questions, thanks!

## 2019-06-13 ENCOUNTER — TELEPHONE (OUTPATIENT)
Dept: FAMILY MEDICINE CLINIC | Age: 64
End: 2019-06-13

## 2019-06-13 LAB
ORGANISM: ABNORMAL
URINE CULTURE, ROUTINE: ABNORMAL

## 2019-06-13 NOTE — TELEPHONE ENCOUNTER
----- Message from Hunter Platt, DO sent at 6/13/2019  6:44 AM EDT -----  Please let pt know that urine cx is negative  Let me know if questions, thanks!

## 2019-06-24 ENCOUNTER — HOSPITAL ENCOUNTER (OUTPATIENT)
Dept: ULTRASOUND IMAGING | Age: 64
Discharge: HOME | End: 2019-06-24
Payer: MEDICARE

## 2019-06-24 DIAGNOSIS — I71.40 ABDOMINAL AORTIC ANEURYSM (AAA) WITHOUT RUPTURE: ICD-10-CM

## 2019-06-24 PROCEDURE — 76775 US EXAM ABDO BACK WALL LIM: CPT

## 2019-06-25 ENCOUNTER — TELEPHONE (OUTPATIENT)
Dept: FAMILY MEDICINE CLINIC | Age: 64
End: 2019-06-25

## 2019-06-25 NOTE — TELEPHONE ENCOUNTER
----- Message from Sharifa Dennison DO sent at 6/25/2019  6:07 AM EDT -----  Please let pt know that US of abdominal aorta shows very small aneurysm, about 2.9 CM. Based on this size, repeat US is recommended in 3 years. Let me know if questions, thanks!

## 2019-07-09 ENCOUNTER — TELEPHONE (OUTPATIENT)
Dept: FAMILY MEDICINE CLINIC | Age: 64
End: 2019-07-09

## 2019-12-22 ENCOUNTER — TELEPHONE (OUTPATIENT)
Dept: FAMILY MEDICINE CLINIC | Age: 64
End: 2019-12-22

## 2019-12-22 DIAGNOSIS — I71.20 THORACIC AORTIC ANEURYSM WITHOUT RUPTURE: Primary | ICD-10-CM

## 2019-12-23 ENCOUNTER — TELEPHONE (OUTPATIENT)
Dept: FAMILY MEDICINE CLINIC | Age: 64
End: 2019-12-23

## 2020-01-16 ENCOUNTER — HOSPITAL ENCOUNTER (OUTPATIENT)
Dept: NON INVASIVE DIAGNOSTICS | Age: 65
Discharge: HOME | End: 2020-01-16
Payer: MEDICARE

## 2020-01-16 LAB
LV EF: 53 %
LVEF MODALITY: NORMAL

## 2020-01-16 PROCEDURE — 93306 TTE W/DOPPLER COMPLETE: CPT

## 2020-01-17 ENCOUNTER — TELEPHONE (OUTPATIENT)
Dept: FAMILY MEDICINE CLINIC | Age: 65
End: 2020-01-17

## 2020-01-17 NOTE — TELEPHONE ENCOUNTER
----- Message from Maynor Braxton DO sent at 1/16/2020  7:54 PM EST -----  Please let pt know that echo looked stable, Aortic aneurysm measures 4.2 cm, which is stable from a year ago. Recommend we repeat this in a year. Will call as time gets closer. Let me know if questions, thanks!

## 2020-02-24 ENCOUNTER — TELEPHONE (OUTPATIENT)
Dept: FAMILY MEDICINE CLINIC | Age: 65
End: 2020-02-24

## 2020-02-24 NOTE — TELEPHONE ENCOUNTER
Per HIPAA, left a detailed message asking pt for the name and number of the NP to confirm if the Depakote is truly needing a script from .

## 2020-02-24 NOTE — TELEPHONE ENCOUNTER
Pt called stating he is unable to gt his prescription for the Depakote due to being prescribe by his therapist that is a nurse practitioner. Pt states the Depakote is needing a verbal or a new script to be sent to the pharmacy. Please advise. [facility]  [place], [place]       Please advise

## 2020-02-24 NOTE — TELEPHONE ENCOUNTER
Please f/u with his NP's office to see where the disconnect is  Typically NP's can rx this no problem  Just want to clarify some things 1st  Let me know, thanks!

## 2020-02-25 NOTE — TELEPHONE ENCOUNTER
Per HIPAA, left a detailed message asking pt for the name and number of the NP to confirm if the Depakote is truly needing a script from

## 2020-06-10 NOTE — PROGRESS NOTES
Chief Complaint   Patient presents with    Medicare AW    Follow-up     Chronic conditions       History obtained from the patient. SUBJECTIVE:  Patricia Muniz is a 77 y.o. male that presents today for     -Mild Dilated ascending aorta/AAA: found incidentally on echo JAN 2018. No CP/SOB. Repeat echo JAN 2019  And JAN 2020 is stable. US to screen for AAA showes small AAA at 2.9 cm. Denies CP/SOB      -HLD: diet controlled; due for labs. Working on lifestyle changes. He's interested in starting lipitor.       -Bipolar INITIAL PRESENTATION: See's psych in [place], has been stable on depakote for many years. Denies manic or depressive sxs. See's therapist twice per month. Plans to con't to see psyc once per year, but would like me to fill depakote for him. Denies SI/HI.     UPDATE PRIOR Visit: Pt here feeling manic with high energy and lack of sleep. Denies SI/HI or risk taking behavior. At last visit pt related he was only taking depakote 250mg ER once daily. However, today he relates that he should actually be taking 1000mg daily, and was on that until he saw me back in May. Over the last month is when these sxs have started. Also relates that his psych in [place] has left practice and so is w/o psych at the moment. Working on getting another. Still seeing therapist regularly.      UPDATE PRIOR VISIT: Moods feel more stable, still having some issues with sleep. Anxiety is less, feeling less pressured. Denies SI/HI. Has not made any headway into seeing psyciatry. Still seeing therapy. Did depakote level today, still pending.      UPDATE PRIOr VISIT: states has had some increased moodiness. Still not seeing psych. Not sleeping well over the last few months. No risk taking behaviors but is overall feeling manic. Admits to sporadically taking depakote.  Denies SI/HI. Had an incident at work that apparently has caused some issues prompting him to consider resuming his medications.  He has an intake with Kim upcoming and is continuing with his counseling.      UPDATE PRIOR VISIT: seeing psych NP now. Apparently was seeing before, but just for therapy. He is asking if she can assume medical treatment for his bipolar. Manic sxs improved with starting of depakote. He did not do labs ordered last visit.      UPDATE LAST VISIT: stable, doing well. Following with pysch.  meds working well. No SI/HI     UPDATE TODAY: still following with psych. On depakokte; working well. Denies SI/HI      -LUTS LAST VISIT:   over the last 6 months having increased urgency sxs. Once has the urge to go has trouble holding his urine. No dec stream. No hematuria. No trouble starting/stopping strem  No weak stream  No dysuria  No inc frequency.    Dad had prostate CA in his [de-identified]    UPDATE TODAY:   sxs resolved  Not taking flomax  Doing well     Age/Gender Health Maintenance    Lipid -   No components found for: CHLPL  Lab Results   Component Value Date    TRIG 65 06/11/2019    TRIG 84 03/30/2018    TRIG 102 01/11/2017     Lab Results   Component Value Date    HDL 43 06/11/2019    HDL 42 03/30/2018    HDL 49 01/11/2017     Lab Results   Component Value Date    LDLCALC 105 06/11/2019    LDLCALC 120 03/30/2018    LDLCALC 122 01/11/2017     Lab Results   Component Value Date    LABVLDL 17 03/30/2018    LABVLDL 20 11/12/2015       DM Screen -   Lab Results   Component Value Date    GLUCOSE 89 06/11/2019    GLUCOSE 97 03/30/2018       Colon Cancer Screening - NEG June 2015, repeat 10 years    Tetanus - UTD MAY 2014  Influenza Vaccine - Candidate FALL 2020  Pneumonia Vaccine - UTD PCV 13 (June 2018)/PPV23 in June 2019  Zoster - to get at pharmacy per medicare rules    PSA testing discussion - NOV 2015  PSA, Ultrasensitive 0.78 <4.0 ng/ml Final     Lab Results   Component Value Date    PSA 1.25 (H) 06/11/2019    PSA 1.31 01/11/2017    PSA 2.83 11/01/2014     Component      Latest Ref Rng & Units 3/30/2018          11:00 AM   PSA, Ultrasensitive 8/25/2015    History of small bowel obstruction     Many years ago    Hyperlipidemia     Diet Controlled    Insomnia     Mild dilation of ascending aorta (HCC)     Primary osteoarthritis of left hand 8/27/2015       Past Surgical History:   Procedure Laterality Date    SKIN BIOPSY      SKIN CANCER EXCISION      TONSILLECTOMY AND ADENOIDECTOMY      WISDOM TOOTH EXTRACTION         No Known Allergies    Social History     Tobacco Use    Smoking status: Never Smoker    Smokeless tobacco: Never Used   Substance Use Topics    Alcohol use: No       Family History   Problem Relation Age of Onset    Heart Disease Mother     Cancer Father     Prostate Cancer Father [de-identified]    Colon Cancer Neg Hx        I have reviewed the patient's past medical history, past surgical history, allergies, medications, social and family history and I have made updates where appropriate.       Review of Systems  Positive responses are highlighted in bold    Constitutional:  Fever, Chills, Night Sweats, Fatigue, Unexpected changes in weight  HENT:  Ear pain, Tinnitus, Nosebleeds, Trouble swallowing, Hearing loss, Sore throat  Cardiovascular:  Chest Pain, Palpitations, Orthopnea, Paroxysmal Nocturnal Dyspnea  Respiratory:  Cough, Wheezing, Shortness of breath, Chest tightness, Apnea  Gastrointestinal:  Nausea, Vomiting, Diarrhea, Constipation, Heartburn, Blood in stool  Genitourinary:  Difficulty or painful urination, Flank pain, Change in frequency, Urgency  Skin:  Color change, Rash, Itching, Wound  Musculoskeletal:  Joint pain, Back pain, Gait problems, Joint swelling, Myalgias  Neurological:  Dizziness, Headaches, Presyncope, Numbness, Seizures, Tremors  Endocrine:  Heat Intolerance, Cold Intolerance, Polydipsia, Polyphagia, Polyuria      PHYSICAL EXAM:  Vitals:    06/11/20 1042   BP: 118/82   Pulse: 70   Resp: 14   Temp: 98 °F (36.7 °C)   TempSrc: Oral   SpO2: 95%   Weight: 221 lb 9.6 oz (100.5 kg)   Height: 5' 8.11\" (1.73 m) Body mass index is 33.59 kg/m². Pain Score: 3(low back)    VS Reviewed  General Appearance: A&O x 3, No acute distress,well developed and well- nourished  Eyes: pupils equal, round, and reactive to light, extraocular eye movements intact, conjunctivae and eye lids without erythema  ENT: external ear and ear canal clear bilaterally, TMs intact and regular, nose without deformity, nasal mucosa and turbinates normal without polyps, oropharynx normal, dentition is normal for age  Neck: supple and non-tender without mass, no thyromegaly or thyroid nodules, no cervical lymphadenopathy  Pulmonary/Chest: clear to auscultation bilaterally- no wheezes, rales or rhonchi, normal air movement, no respiratory distress or retractions  Cardiovascular: S1 and S2 auscultated w/ RRR. No murmurs, rubs, clicks, or gallops, distal pulses intact. Abdomen: soft, non-tender, non-distended, bowl sounds physiologic,  no rebound or guarding, no masses or hernias noted. Liver and spleen without enlargement. Extremities: no cyanosis, clubbing or edema of the lower extremities. Skin: warm and dry, no rash or erythema. Excisions healing well. ECHO 16 JAN 2020   Conclusions      Summary   Technically difficult examination. Normal left ventricle size and systolic function. Ejection fraction was estimated at 50 to 55 %. There were no regional left ventricular wall motion abnormalities and wall   thickness was within normal limits. Doppler parameters were consistent with abnormal left ventricular   relaxation (grade 1 diastolic dysfunction). The left atrium is Mild to moderate dilated. Aortic aneurysm noted in the ascending aorta . Aortic aneurysm measures 4.2 cm . ECHO 15 TONYA 2019   Conclusions      Summary   Technically difficult examination.   Normal left ventricle size and systolic function. Ejection fraction was   estimated at 55 to 60 %.  There were no regional left ventricular wall   motion abnormalities and Historical Provider, MD   divalproex (DEPAKOTE ER) 250 MG extended release tablet Take 2 tablets by mouth daily  Patient taking differently: Take 750 mg by mouth daily  Yes Annelise Llanos DO   acetaminophen (TYLENOL) 325 MG tablet Take 650 mg by mouth every 6 hours as needed for Pain.  Yes Historical Provider, MD   tiZANidine (ZANAFLEX) 4 MG tablet Take 1 tablet by mouth 3 times daily as needed (back pain/spasms)  Annelise Llanos DO         Past Medical History:   Diagnosis Date    AAA (abdominal aortic aneurysm) (HCC)     2.9 CM    Bipolar 1 disorder ([address]     BPH (benign prostatic hyperplasia)     Chronic low back pain     Eczema     Hearing loss of both ears     Hearing aids    Hepatic cyst     Benign (see imaging)    History of basal cell carcinoma     Skin - left shoulder     History of compression fracture of lumbar spine, L1     While in the [employer] in his 17's    History of skin cancer 8/25/2015    History of small bowel obstruction     Many years ago    Hyperlipidemia     Diet Controlled    Insomnia     Mild dilation of ascending aorta (HCC)     Primary osteoarthritis of left hand 8/27/2015       Past Surgical History:   Procedure Laterality Date    SKIN BIOPSY      SKIN CANCER EXCISION      TONSILLECTOMY AND ADENOIDECTOMY      WISDOM TOOTH EXTRACTION           Family History   Problem Relation Age of Onset    Heart Disease Mother     Cancer Father     Prostate Cancer Father [de-identified]    Colon Cancer Neg Hx        CareTeam (Including outside providers/suppliers regularly involved in providing care):   Patient Care Team:  Annelise Llanos DO as PCP - General (Family Medicine)  Annelise Llanos DO as PCP - [facility] Empaneled Provider    Wt Readings from Last 3 Encounters:   06/11/20 221 lb 9.6 oz (100.5 kg)   06/11/19 222 lb (100.7 kg)   03/18/19 223 lb 6.4 oz (101.3 kg)     Vitals:    06/11/20 1042   BP: 118/82   Pulse: 70   Resp: 14   Temp: 98 °F (36.7 °C)   TempSrc: Oral   SpO2: 95% Health Maintenance Status  Immunization History   Administered Date(s) Administered    Influenza Virus Vaccine 09/30/2014    Influenza, Yokasta Ramirez, IM, (6 mo and older Fluzone, Flulaval, Fluarix and 3 yrs and older Afluria) 11/28/2016    Pneumococcal Conjugate 13-valent ([id number]) 06/07/2018    Pneumococcal Polysaccharide ([id number]) 06/11/2019    Tdap (Boostrix, Adacel) 05/28/2014        Health Maintenance   Topic Date Due    Shingles Vaccine (1 of 2) 05/03/2003    Annual Wellness Visit (AWV)  05/29/2019    Lipid screen  06/11/2020    PSA counseling  06/11/2020    Flu vaccine (Season Ended) 09/01/2020    DTaP/Tdap/Td vaccine (2 - Td) 05/28/2024    Colon cancer screen colonoscopy  06/16/2025    Pneumococcal 65+ years Vaccine  Completed    Hepatitis C screen  Completed    Hepatitis A vaccine  Aged Out    Hepatitis B vaccine  Aged Out    Hib vaccine  Aged Out    Meningococcal (ACWY) vaccine  Aged Out     Recommendations for [place] Due: see orders and patient instructions/AVS.  . Recommended screening schedule for the next 5-10 years is provided to the patient in written form: see Patient Megha Palmer was seen today for medicare awv.     Diagnoses and all orders for this visit:    Routine general medical examination at a health care facility    Care plan reviewed with pt and given to him      Electronically signed by Leslie Murrieta DO on 6/11/2020 at 11:11 AM

## 2020-06-10 NOTE — PATIENT INSTRUCTIONS
LAB INSTRUCTIONS:    Please complete labs within 6 week(s). Please fast for 8 hours prior to lab collection. The clinic will call you within 1 week of collection. If you have not heard from us within that amount of time, please call us at [phone]. Personalized Preventive Plan for Oxana Lilly - 6/11/2020  Medicare offers a range of preventive health benefits. Some of the tests and screenings are paid in full while other may be subject to a deductible, co-insurance, and/or copay. Some of these benefits include a comprehensive review of your medical history including lifestyle, illnesses that may run in your family, and various assessments and screenings as appropriate. After reviewing your medical record and screening and assessments performed today your provider may have ordered immunizations, labs, imaging, and/or referrals for you. A list of these orders (if applicable) as well as your Preventive Care list are included within your After Visit Summary for your review. Other Preventive Recommendations:    · A preventive eye exam performed by an eye specialist is recommended every 1-2 years to screen for glaucoma; cataracts, macular degeneration, and other eye disorders. · A preventive dental visit is recommended every 6 months. · Try to get at least 150 minutes of exercise per week or 10,000 steps per day on a pedometer . · Order or download the FREE \"Exercise & Physical Activity: Your Everyday Guide\" from [employer]. Call 1-356.225.8382 or search [employer] online. · You need 0479-0665 mg of calcium and 0820-5072 IU of vitamin D per day. It is possible to meet your calcium requirement with diet alone, but a vitamin D supplement is usually necessary to meet this goal.  · When exposed to the sun, use a sunscreen that protects against both UVA and UVB radiation with an SPF of 30 or greater.  Reapply every 2 to 3 hours or after sweating, drying off

## 2020-06-11 ENCOUNTER — OFFICE VISIT (OUTPATIENT)
Dept: FAMILY MEDICINE CLINIC | Age: 65
End: 2020-06-11
Payer: MEDICARE

## 2020-06-11 ENCOUNTER — TELEPHONE (OUTPATIENT)
Dept: FAMILY MEDICINE CLINIC | Age: 65
End: 2020-06-11

## 2020-06-11 VITALS
BODY MASS INDEX: 33.59 KG/M2 | TEMPERATURE: 98 F | DIASTOLIC BLOOD PRESSURE: 82 MMHG | HEIGHT: 68 IN | WEIGHT: 221.6 LBS | OXYGEN SATURATION: 95 % | RESPIRATION RATE: 14 BRPM | SYSTOLIC BLOOD PRESSURE: 118 MMHG | HEART RATE: 70 BPM

## 2020-06-11 PROCEDURE — 3017F COLORECTAL CA SCREEN DOC REV: CPT | Performed by: FAMILY MEDICINE

## 2020-06-11 PROCEDURE — 1123F ACP DISCUSS/DSCN MKR DOCD: CPT | Performed by: FAMILY MEDICINE

## 2020-06-11 PROCEDURE — 4040F PNEUMOC VAC/ADMIN/RCVD: CPT | Performed by: FAMILY MEDICINE

## 2020-06-11 PROCEDURE — G0438 PPPS, INITIAL VISIT: HCPCS | Performed by: FAMILY MEDICINE

## 2020-06-11 RX ORDER — TIZANIDINE 4 MG/1
4 TABLET ORAL 3 TIMES DAILY PRN
Qty: 90 TABLET | Refills: 3 | Status: SHIPPED | OUTPATIENT
Start: 2020-06-11

## 2020-06-11 RX ORDER — ATORVASTATIN CALCIUM 20 MG/1
20 TABLET, FILM COATED ORAL DAILY
Qty: 90 TABLET | Refills: 3 | Status: SHIPPED | OUTPATIENT
Start: 2020-06-11

## 2020-06-11 ASSESSMENT — PATIENT HEALTH QUESTIONNAIRE - PHQ9
SUM OF ALL RESPONSES TO PHQ9 QUESTIONS 1 & 2: 0
1. LITTLE INTEREST OR PLEASURE IN DOING THINGS: 0
SUM OF ALL RESPONSES TO PHQ QUESTIONS 1-9: 0
SUM OF ALL RESPONSES TO PHQ QUESTIONS 1-9: 0
2. FEELING DOWN, DEPRESSED OR HOPELESS: 0

## 2020-06-11 ASSESSMENT — LIFESTYLE VARIABLES: HOW OFTEN DO YOU HAVE A DRINK CONTAINING ALCOHOL: 0

## 2020-06-11 ASSESSMENT — VISUAL ACUITY: OS_CC: 20/20

## 2020-06-12 NOTE — TELEPHONE ENCOUNTER
Pt in office today for AWV etc    Also c/o chronic low back pain    Sees chiro and gets massage    Asking for trial of muscle relaxer as well. Will trial zanaflex    He will f/u if ineffective     Diagnosis Orders   1.  Chronic bilateral low back pain without sciatica  tiZANidine (ZANAFLEX) 4 MG tablet

## 2020-12-17 ENCOUNTER — OFFICE VISIT (OUTPATIENT)
Dept: FAMILY MEDICINE CLINIC | Age: 65
End: 2020-12-17
Payer: MEDICARE

## 2020-12-17 VITALS
HEART RATE: 82 BPM | BODY MASS INDEX: 34.65 KG/M2 | HEIGHT: 67 IN | DIASTOLIC BLOOD PRESSURE: 80 MMHG | TEMPERATURE: 98.8 F | WEIGHT: 220.8 LBS | SYSTOLIC BLOOD PRESSURE: 122 MMHG | RESPIRATION RATE: 12 BRPM | OXYGEN SATURATION: 93 %

## 2020-12-17 PROCEDURE — G8484 FLU IMMUNIZE NO ADMIN: HCPCS | Performed by: FAMILY MEDICINE

## 2020-12-17 PROCEDURE — 1123F ACP DISCUSS/DSCN MKR DOCD: CPT | Performed by: FAMILY MEDICINE

## 2020-12-17 PROCEDURE — 1036F TOBACCO NON-USER: CPT | Performed by: FAMILY MEDICINE

## 2020-12-17 PROCEDURE — 3017F COLORECTAL CA SCREEN DOC REV: CPT | Performed by: FAMILY MEDICINE

## 2020-12-17 PROCEDURE — G8417 CALC BMI ABV UP PARAM F/U: HCPCS | Performed by: FAMILY MEDICINE

## 2020-12-17 PROCEDURE — G8427 DOCREV CUR MEDS BY ELIG CLIN: HCPCS | Performed by: FAMILY MEDICINE

## 2020-12-17 PROCEDURE — 99214 OFFICE O/P EST MOD 30 MIN: CPT | Performed by: FAMILY MEDICINE

## 2020-12-17 PROCEDURE — 4040F PNEUMOC VAC/ADMIN/RCVD: CPT | Performed by: FAMILY MEDICINE

## 2020-12-17 NOTE — PROGRESS NOTES
Chief Complaint   Patient presents with    Leg Pain     right leg pain x 1 month    Hyperlipidemia    Other     TAA f/u       History obtained from the patient. SUBJECTIVE:  Adal Galindo is a 77 y.o. male that presents today for       -R leg pain:  Started about a month ago  Only happens at night   Only the R leg  Only when laying down  Will get sharp pain that starts in the thigh and hip and goes down to the calf and ankle. Doesn't feel like a cramp  If stays laying will last 45 min  But wants sits up and stands up will go away  Goes to bed a 10, starts wound 1 AM  Able to go back to bed and sleep until morning. Does have chornic hip and back pain that's some worse.          -HLD:    HPI:  Started on lipitor  Tolerating well  Overdue for f/u labs  Needs orders reprinted    Taking meds as prescribed ?: yes  Tolerating well ?: yes  Side Effects ?: denies  Muscle Pain?: denies  Working on TLCS ?: yes        -TAA:  Noted on echo  Stable 4.2 cm  Due for f/u echo jan 2020  Needs order  Denies CP/SOB      Age/Gender Health Maintenance    Lipid -   No components found for: CHLPL  Lab Results   Component Value Date    TRIG 65 06/11/2019    TRIG 84 03/30/2018    TRIG 102 01/11/2017     Lab Results   Component Value Date    HDL 43 06/11/2019    HDL 42 03/30/2018    HDL 49 01/11/2017     Lab Results   Component Value Date    LDLCALC 105 06/11/2019    LDLCALC 120 03/30/2018    LDLCALC 122 01/11/2017     Lab Results   Component Value Date    LABVLDL 17 03/30/2018    LABVLDL 20 11/12/2015       DM Screen -   Lab Results   Component Value Date    GLUCOSE 89 06/11/2019    GLUCOSE 97 03/30/2018       Colon Cancer Screening - NEG June 2015, repeat 10 years    Tetanus - UTD MAY 2014  Influenza Vaccine - UTD FALL 2020  Pneumonia Vaccine - UTD PCV 13 (June 2018)/PPV23 in June 2019  Zoster - to get at pharmacy per medicare rules    PSA testing discussion - NOV 2015  PSA, Ultrasensitive 0.78 <4.0 ng/ml Final     Lab Results   Component Value Date    PSA 1.25 (H) 06/11/2019    PSA 1.31 01/11/2017    PSA 2.83 11/01/2014     Component      Latest Ref Rng & Units 3/30/2018          11:00 AM   PSA, Ultrasensitive      <=4.00 ng/mL 1.180       AAA Screening - non-smoker      Current Outpatient Medications   Medication Sig Dispense Refill    atorvastatin (LIPITOR) 20 MG tablet Take 1 tablet by mouth daily 90 tablet 3    tiZANidine (ZANAFLEX) 4 MG tablet Take 1 tablet by mouth 3 times daily as needed (back pain/spasms) 90 tablet 3    traZODone (DESYREL) 100 MG tablet Take 100 mg by mouth nightly as needed       divalproex (DEPAKOTE ER) 250 MG extended release tablet Take 2 tablets by mouth daily (Patient taking differently: Take 750 mg by mouth daily ) 60 tablet 5    acetaminophen (TYLENOL) 325 MG tablet Take 650 mg by mouth every 6 hours as needed for Pain. No current facility-administered medications for this visit. No orders of the defined types were placed in this encounter. All medications reviewed and reconciled, including OTC and herbal medications. Updated list given to patient.        Patient Active Problem List   Diagnosis    Bipolar 1 disorder ([facility] [address]    History of basal cell carcinoma    Hyperlipidemia    Insomnia    Eczema    History of skin cancer    Primary osteoarthritis of left hand    History of compression fracture of lumbar spine, L1    History of small bowel obstruction    Thoracic aortic aneurysm (TAA) (HCC)    BPH (benign prostatic hyperplasia)    AAA (abdominal aortic aneurysm) (HCC)    Chronic low back pain       Past Medical History:   Diagnosis Date    AAA (abdominal aortic aneurysm) (HCC)     2.9 CM    Bipolar 1 disorder (HCC)     BPH (benign prostatic hyperplasia)     Chronic low back pain     Eczema     Hearing loss of both ears     Hearing aids    Hepatic cyst     Benign (see imaging)    History of basal cell carcinoma     Skin - left shoulder     History of compression fracture of lumbar spine, L1     While in the [employer] in his 17's    History of skin cancer 08/25/2015    History of small bowel obstruction     Many years ago    Hyperlipidemia     Insomnia     Primary osteoarthritis of left hand 08/27/2015    Thoracic aortic aneurysm (TAA) (HCC)        Past Surgical History:   Procedure Laterality Date    SKIN BIOPSY      SKIN CANCER EXCISION      TONSILLECTOMY AND ADENOIDECTOMY      WISDOM TOOTH EXTRACTION         No Known Allergies    Social History     Tobacco Use    Smoking status: Never Smoker    Smokeless tobacco: Never Used   Substance Use Topics    Alcohol use: No       Family History   Problem Relation Age of Onset    Heart Disease Mother     Cancer Father     Prostate Cancer Father [de-identified]    Colon Cancer Neg Hx        I have reviewed the patient's past medical history, past surgical history, allergies, medications, social and family history and I have made updates where appropriate.       Review of Systems  Positive responses are highlighted in bold    Constitutional:  Fever, Chills, Night Sweats, Fatigue, Unexpected changes in weight  HENT:  Ear pain, Tinnitus, Nosebleeds, Trouble swallowing, Hearing loss, Sore throat  Cardiovascular:  Chest Pain, Palpitations, Orthopnea, Paroxysmal Nocturnal Dyspnea  Respiratory:  Cough, Wheezing, Shortness of breath, Chest tightness, Apnea  Gastrointestinal:  Nausea, Vomiting, Diarrhea, Constipation, Heartburn, Blood in stool  Genitourinary:  Difficulty or painful urination, Flank pain, Change in frequency, Urgency  Skin:  Color change, Rash, Itching, Wound  Musculoskeletal:  Joint pain, Back pain, Gait problems, Joint swelling, Myalgias  Neurological:  Dizziness, Headaches, Presyncope, Numbness, Seizures, Tremors  Endocrine:  Heat Intolerance, Cold Intolerance, Polydipsia, Polyphagia, Polyuria      PHYSICAL EXAM:  Vitals:    12/17/20 1532   BP: 122/80   Pulse: 82   Resp: 12   Temp: 98.8 °F (37.1 °C)   TempSrc: Oral   SpO2: 93%   Weight: 220 lb 12.8 oz (100.2 kg)   Height: 5' 7.13\" (1.705 m)     Body mass index is 34.45 kg/m². Pain Score: 2(back/joints, chronic)    VS Reviewed  General Appearance: A&O x 3, No acute distress,well developed and well- nourished  Eyes: pupils equal, round, and reactive to light, extraocular eye movements intact, conjunctivae and eye lids without erythema  ENT: external ear and ear canal clear bilaterally, TMs intact and regular, nose without deformity, nasal mucosa and turbinates normal without polyps, oropharynx normal, dentition is normal for age  Neck: supple and non-tender without mass, no thyromegaly or thyroid nodules, no cervical lymphadenopathy  Pulmonary/Chest: clear to auscultation bilaterally- no wheezes, rales or rhonchi, normal air movement, no respiratory distress or retractions  Cardiovascular: S1 and S2 auscultated w/ RRR. No murmurs, rubs, clicks, or gallops, distal pulses intact. Abdomen: soft, non-tender, non-distended, bowl sounds physiologic,  no rebound or guarding, no masses or hernias noted. Liver and spleen without enlargement. Extremities: no cyanosis, clubbing or edema of the lower extremities. Skin: warm and dry, no rash or erythema. Excisions healing well. HIP EXAM:  Examination of the right hip shows: There is not deformity. There is not erythema. There is mild pain with internal and external rotation. There is mild pain with flexion and extension. There isnone pain with active SLR. ROM diminished range of motion. Leg lengths: Equal  Trochanteric region is  tender to palpation. TTP along IT Band  Sacral Iliac is not tender to palpation. There is none pain with weight bearing. ASSESSMENT & PLAN  1.  Right leg pain    Unclear etiology  Appears positional  Likely related to some hip and back issues coupled with IT band    Plan:  Get xrays  Refer to PT  F/u after PT if no better    - External Referral To Physical Therapy  - XR LUMBAR SPINE (2-3 VIEWS); Future  - XR HIP RIGHT (2-3 VIEWS); Future  - XR SACROILIAC JOINTS (MIN 3 VIEWS); Future    2. It band syndrome, right    As per # 1    - External Referral To Physical Therapy  - XR LUMBAR SPINE (2-3 VIEWS); Future  - XR HIP RIGHT (2-3 VIEWS); Future  - XR SACROILIAC JOINTS (MIN 3 VIEWS); Future    3. Chronic right hip pain    As per # 1    - External Referral To Physical Therapy  - XR LUMBAR SPINE (2-3 VIEWS); Future  - XR HIP RIGHT (2-3 VIEWS); Future  - XR SACROILIAC JOINTS (MIN 3 VIEWS); Future    4. Degenerative disc disease, lumbar    As per # 1    - External Referral To Physical Therapy  - XR LUMBAR SPINE (2-3 VIEWS); Future  - XR HIP RIGHT (2-3 VIEWS); Future  - XR SACROILIAC JOINTS (MIN 3 VIEWS); Future    5. Mixed hyperlipidemia    con't lipitor  Overdue for f/u labs  Reprinted today    6. Thoracic aortic aneurysm without rupture (HCC)    Stable in size  con't RF mod  Due for f/u echo JAN 2021    - ECHO Complete 2D W Doppler W Color; Future      DISPOSITION    Return if symptoms worsen or fail to improve. Ronnie Distel released without restrictions. Future Appointments   Date Time Provider Orville Leyva   6/14/2021  3:00 PM Lauren Vega DO [facility] [address]    Patient given educational materials on: See Attached    Barriers to learning and self management: none    Discussed use, benefit, and side effects of prescribed medications. Barriers to medication compliance addressed. All patient questions answered. Pt voiced understanding.        Electronically signed by Lauren Vega DO on 12/17/2020 at 4:22 PM

## 2021-01-11 ENCOUNTER — TELEPHONE (OUTPATIENT)
Dept: FAMILY MEDICINE CLINIC | Age: 66
End: 2021-01-11

## 2021-01-11 DIAGNOSIS — I71.20 THORACIC AORTIC ANEURYSM WITHOUT RUPTURE: Primary | Chronic | ICD-10-CM

## 2021-01-15 ENCOUNTER — TELEPHONE (OUTPATIENT)
Dept: FAMILY MEDICINE CLINIC | Age: 66
End: 2021-01-15

## 2021-01-15 NOTE — TELEPHONE ENCOUNTER
Pt scheduled for CTA chest at [facility], [facility] entrance #3 on 1/22/21at 2:30, pt to be there 2:15. Pt to drink 32 oz water prior just for hydration. Pt does not have to have a full bladder. order faxed to [address] [place] [address] at Fax# [phone]. Left detailed msg on pt's mach with appt details. Ok per signed HIPAA.

## 2021-01-17 LAB — PROSTATE SPECIFIC ANTIGEN: 0.9 NG/ML

## 2021-01-19 ENCOUNTER — TELEPHONE (OUTPATIENT)
Dept: FAMILY MEDICINE CLINIC | Age: 66
End: 2021-01-19

## 2021-01-19 DIAGNOSIS — I71.20 THORACIC AORTIC ANEURYSM WITHOUT RUPTURE: Primary | ICD-10-CM

## 2021-01-19 DIAGNOSIS — E78.2 MIXED HYPERLIPIDEMIA: ICD-10-CM

## 2021-01-19 NOTE — TELEPHONE ENCOUNTER
Spoke to [facility], they do not have a lab order for lipids , they do have enough blood to run them if desired .      I do not see an active order in Epic to print and fax

## 2021-01-19 NOTE — TELEPHONE ENCOUNTER
Diagnosis Orders   1. Thoracic aortic aneurysm without rupture (HCC)  Lipid Panel   2.  Mixed hyperlipidemia  Lipid Panel

## 2021-01-19 NOTE — TELEPHONE ENCOUNTER
----- Message from Linda Recinos, DO sent at 1/18/2021  7:21 PM EST -----  Please let pt know that labs look good  No concerning findings  The lab did not send results on lipids, can you f/u with them and get those sent over? Thanks!

## 2021-01-20 ENCOUNTER — TELEPHONE (OUTPATIENT)
Dept: FAMILY MEDICINE CLINIC | Age: 66
End: 2021-01-20

## 2021-01-20 LAB
CHOLESTEROL, TOTAL: 170 MG/DL
CHOLESTEROL/HDL RATIO: NORMAL
HDLC SERPL-MCNC: 40 MG/DL (ref 35–70)
LDL CHOLESTEROL CALCULATED: 117 MG/DL (ref 0–160)
NONHDLC SERPL-MCNC: NORMAL MG/DL
TRIGL SERPL-MCNC: 68 MG/DL
VLDLC SERPL CALC-MCNC: 13 MG/DL

## 2021-01-20 NOTE — TELEPHONE ENCOUNTER
----- Message from Uriah Ruiz DO sent at 1/20/2021  9:50 AM EST -----  Pleas let pt know that lipids look good. con't lipitor  Let me know if questions, thanks!

## 2021-02-11 ENCOUNTER — TELEPHONE (OUTPATIENT)
Dept: FAMILY MEDICINE CLINIC | Age: 66
End: 2021-02-11

## 2021-02-11 NOTE — TELEPHONE ENCOUNTER
Pt sent a [employer] message asking for results of CTA chest.     Per pt:  \"Garo Guzman, hope all is well. What were the results of my cat-scan for my aorta. Since I have not heard back I assume all is well? Thanks,  REscour"      I sent pt a [employer] message back, it's been left unread. Please relay the following:     \"I was actually just looking as they had not sent me a copy of the results. I was able to track is down via a web portal (I'd sent myself a reminder to look today if I had not received a copy of the results). Anyway, it looked good. The aneurysm is stable in size and small at 4.2 cm. I would recommend we repeat this CT scan in a year instead of the echo. We will call you when time gets closer to get this setup. Let me know if questions, thanks! R/   Dr LYN. \"      Let me know if questions, thanks!

## 2021-02-11 NOTE — TELEPHONE ENCOUNTER
Per HIPAA, left detailed message for patient letting them know the results of their CTA. Patient was advised to call the office with any questions.

## 2021-06-14 ASSESSMENT — PATIENT HEALTH QUESTIONNAIRE - PHQ9
SUM OF ALL RESPONSES TO PHQ QUESTIONS 1-9: 0
SUM OF ALL RESPONSES TO PHQ QUESTIONS 1-9: 0
SUM OF ALL RESPONSES TO PHQ9 QUESTIONS 1 & 2: 0
2. FEELING DOWN, DEPRESSED OR HOPELESS: 0
1. LITTLE INTEREST OR PLEASURE IN DOING THINGS: 0
SUM OF ALL RESPONSES TO PHQ QUESTIONS 1-9: 0

## 2021-06-14 ASSESSMENT — LIFESTYLE VARIABLES
AUDIT TOTAL SCORE: INCOMPLETE
AUDIT-C TOTAL SCORE: INCOMPLETE
HOW OFTEN DO YOU HAVE A DRINK CONTAINING ALCOHOL: 0
HOW OFTEN DO YOU HAVE A DRINK CONTAINING ALCOHOL: NEVER

## 2021-06-21 ENCOUNTER — OFFICE VISIT (OUTPATIENT)
Dept: FAMILY MEDICINE CLINIC | Age: 66
End: 2021-06-21
Payer: MEDICARE

## 2021-06-21 VITALS
HEART RATE: 86 BPM | BODY MASS INDEX: 33.95 KG/M2 | TEMPERATURE: 98.4 F | DIASTOLIC BLOOD PRESSURE: 72 MMHG | WEIGHT: 224 LBS | SYSTOLIC BLOOD PRESSURE: 120 MMHG | RESPIRATION RATE: 16 BRPM | HEIGHT: 68 IN | OXYGEN SATURATION: 98 %

## 2021-06-21 DIAGNOSIS — E78.2 MIXED HYPERLIPIDEMIA: ICD-10-CM

## 2021-06-21 DIAGNOSIS — F31.9 BIPOLAR 1 DISORDER (HCC): ICD-10-CM

## 2021-06-21 DIAGNOSIS — I71.40 ABDOMINAL AORTIC ANEURYSM (AAA) WITHOUT RUPTURE: ICD-10-CM

## 2021-06-21 DIAGNOSIS — I77.810 MILD DILATION OF ASCENDING AORTA (HCC): ICD-10-CM

## 2021-06-21 DIAGNOSIS — Z00.00 ROUTINE GENERAL MEDICAL EXAMINATION AT A HEALTH CARE FACILITY: Primary | ICD-10-CM

## 2021-06-21 PROCEDURE — G0439 PPPS, SUBSEQ VISIT: HCPCS | Performed by: FAMILY MEDICINE

## 2021-06-21 PROCEDURE — 3017F COLORECTAL CA SCREEN DOC REV: CPT | Performed by: FAMILY MEDICINE

## 2021-06-21 PROCEDURE — 4040F PNEUMOC VAC/ADMIN/RCVD: CPT | Performed by: FAMILY MEDICINE

## 2021-06-21 PROCEDURE — 1123F ACP DISCUSS/DSCN MKR DOCD: CPT | Performed by: FAMILY MEDICINE

## 2021-06-21 NOTE — PROGRESS NOTES
Chief Complaint   Patient presents with    Medicare AWV    Follow-up     Chronic issues as noted below       History obtained from the patient. SUBJECTIVE:  Rush Gill is a 74 y.o. male that presents today for     -Mild Dilated ascending aorta/AAA: found incidentally on echo JAN 2018. No CP/SOB. Repeat echo JAN 2019  And JAN 2020 is stable as was CTA chest JAN 2021  US to screen for AAA showes small AAA at 2.9 cm. Denies CP/SOB      -HLD:    HPI:    Taking meds as prescribed ?: yes  Tolerating well ?: yes  Side Effects ?: denies  Muscle Pain?: denies  Working on TLCS ?: yes      -Bipolar INITIAL PRESENTATION: See's psych in [place], has been stable on depakote for many years. Denies manic or depressive sxs. See's therapist twice per month. Plans to con't to see psyc once per year, but would like me to fill depakote for him. Denies SI/HI.     UPDATE PRIOR Visit: Pt here feeling manic with high energy and lack of sleep. Denies SI/HI or risk taking behavior. At last visit pt related he was only taking depakote 250mg ER once daily. However, today he relates that he should actually be taking 1000mg daily, and was on that until he saw me back in May. Over the last month is when these sxs have started. Also relates that his psych in [place] has left practice and so is w/o psych at the moment. Working on getting another. Still seeing therapist regularly.      UPDATE PRIOR VISIT: Moods feel more stable, still having some issues with sleep. Anxiety is less, feeling less pressured. Denies SI/HI. Has not made any headway into seeing psyciatry. Still seeing therapy. Did depakote level today, still pending.      UPDATE PRIOr VISIT: states has had some increased moodiness. Still not seeing psych. Not sleeping well over the last few months. No risk taking behaviors but is overall feeling manic. Admits to sporadically taking depakote.  Denies SI/HI.  Had an incident at work that apparently has caused some issues times daily as needed (back pain/spasms) 90 tablet 3    traZODone (DESYREL) 100 MG tablet Take 100 mg by mouth nightly as needed       divalproex (DEPAKOTE ER) 250 MG extended release tablet Take 2 tablets by mouth daily (Patient taking differently: Take 750 mg by mouth daily ) 60 tablet 5    acetaminophen (TYLENOL) 325 MG tablet Take 650 mg by mouth every 6 hours as needed for Pain. No current facility-administered medications for this visit. No orders of the defined types were placed in this encounter. All medications reviewed and reconciled, including OTC and herbal medications. Updated list given to patient.        Patient Active Problem List   Diagnosis    Bipolar 1 disorder ([facility] [address]    History of basal cell carcinoma    Hyperlipidemia    Insomnia    Eczema    History of skin cancer    Primary osteoarthritis of left hand    History of compression fracture of lumbar spine, L1    History of small bowel obstruction    Thoracic aortic aneurysm (TAA) (HCC)    BPH (benign prostatic hyperplasia)    AAA (abdominal aortic aneurysm) (HCC)    Chronic low back pain       Past Medical History:   Diagnosis Date    AAA (abdominal aortic aneurysm) (HCC)     2.9 CM    Bipolar 1 disorder (HCC)     BPH (benign prostatic hyperplasia)     Chronic low back pain     Eczema     Hearing loss of both ears     Hearing aids    Hepatic cyst     Benign (see imaging)    History of basal cell carcinoma     Skin - left shoulder     History of compression fracture of lumbar spine, L1     While in the [employer] in his 17's    History of skin cancer 08/25/2015    History of small bowel obstruction     Many years ago    Hyperlipidemia     Insomnia     Primary osteoarthritis of left hand 08/27/2015    Thoracic aortic aneurysm (TAA) (HCC)        Past Surgical History:   Procedure Laterality Date    SKIN BIOPSY      SKIN CANCER EXCISION      TONSILLECTOMY AND ADENOIDECTOMY      WISDOM TOOTH EXTRACTION No Known Allergies    Social History     Tobacco Use    Smoking status: Never Smoker    Smokeless tobacco: Never Used   Substance Use Topics    Alcohol use: No       Family History   Problem Relation Age of Onset    Heart Disease Mother     Cancer Father     Prostate Cancer Father [de-identified]    Colon Cancer Neg Hx        I have reviewed the patient's past medical history, past surgical history, allergies, medications, social and family history and I have made updates where appropriate. Review of Systems  Positive responses are highlighted in bold    Constitutional:  Fever, Chills, Night Sweats, Fatigue, Unexpected changes in weight  HENT:  Ear pain, Tinnitus, Nosebleeds, Trouble swallowing, Hearing loss, Sore throat  Cardiovascular:  Chest Pain, Palpitations, Orthopnea, Paroxysmal Nocturnal Dyspnea  Respiratory:  Cough, Wheezing, Shortness of breath, Chest tightness, Apnea  Gastrointestinal:  Nausea, Vomiting, Diarrhea, Constipation, Heartburn, Blood in stool  Genitourinary:  Difficulty or painful urination, Flank pain, Change in frequency, Urgency  Skin:  Color change, Rash, Itching, Wound  Musculoskeletal:  Joint pain, Back pain, Gait problems, Joint swelling, Myalgias  Neurological:  Dizziness, Headaches, Presyncope, Numbness, Seizures, Tremors  Endocrine:  Heat Intolerance, Cold Intolerance, Polydipsia, Polyphagia, Polyuria      PHYSICAL EXAM:  Vitals:    06/21/21 1459   BP: 120/72   Pulse: 86   Resp: 16   Temp: 98.4 °F (36.9 °C)   TempSrc: Oral   SpO2: 98%   Weight: 224 lb (101.6 kg)   Height: 5' 7.5\" (1.715 m)     Body mass index is 34.57 kg/m².   Pain Score:   3 (back, joints, chronic)    VS Reviewed  General Appearance: A&O x 3, No acute distress,well developed and well- nourished  Eyes: pupils equal, round, and reactive to light, extraocular eye movements intact, conjunctivae and eye lids without erythema  ENT: external ear and ear canal clear bilaterally, TMs intact and regular, nose without deformity, nasal mucosa and turbinates normal without polyps, oropharynx normal, dentition is normal for age  Neck: supple and non-tender without mass, no thyromegaly or thyroid nodules, no cervical lymphadenopathy  Pulmonary/Chest: clear to auscultation bilaterally- no wheezes, rales or rhonchi, normal air movement, no respiratory distress or retractions  Cardiovascular: S1 and S2 auscultated w/ RRR. No murmurs, rubs, clicks, or gallops, distal pulses intact. Abdomen: soft, non-tender, non-distended, bowl sounds physiologic,  no rebound or guarding, no masses or hernias noted. Liver and spleen without enlargement. Extremities: no cyanosis, clubbing or edema of the lower extremities. Skin: warm and dry, no rash or erythema. Excisions healing well. ASSESSMENT & PLAN  1. Mild dilation of ascending aorta (HCC)    Stable  con't RF mod  CTA chest in Jan 2022 with labs  In call back cue    2. Abdominal aortic aneurysm (AAA) without rupture (HCC)    Very small  Repeat 3 years from last    3. Mixed hyperlipidemia    Stable   con't lipitor   Labs in January     4. Bipolar 1 disorder ([facility] [address]    Stable  con't depakote and psych f/u      DISPOSITION    Return in about 1 year (around 6/21/2022) for AWV, follow-up on chronic medical conditions, sooner as needed. [facility] released without restrictions. PATIENT COUNSELING    Nick received counseling on the following healthy behaviors: nutrition, exercise and medication adherence    Patient given educational materials on: See Attached    Barriers to learning and self management: none    Discussed use, benefit, and side effects of prescribed medications. Barriers to medication compliance addressed. All patient questions answered. Pt voiced understanding. Electronically signed by Shane Okeefe DO on 6/21/2021 at 3:23 PM         Medicare Annual Wellness Visit  Name: Bill Banda Date: 6/21/2021   MRN: [id number] Sex: Male   Age: 74 y.o.  Ethnicity: ADENOIDECTOMY      WISDOM TOOTH EXTRACTION           Family History   Problem Relation Age of Onset    Heart Disease Mother     Cancer Father     Prostate Cancer Father [de-identified]   Marc Galla Colon Cancer Neg Hx        CareTeam (Including outside providers/suppliers regularly involved in providing care):   Patient Care Team:  Markel Platt DO as PCP - General (Family Medicine)  Markel Platt DO as PCP - [facility] Empaneled Provider    Wt Readings from Last 3 Encounters:   06/21/21 224 lb (101.6 kg)   12/17/20 220 lb 12.8 oz (100.2 kg)   06/11/20 221 lb 9.6 oz (100.5 kg)     Vitals:    06/21/21 1459   BP: 120/72   Pulse: 86   Resp: 16   Temp: 98.4 °F (36.9 °C)   TempSrc: Oral   SpO2: 98%   Weight: 224 lb (101.6 kg)   Height: 5' 7.5\" (1.715 m)     Body mass index is 34.57 kg/m². Based upon direct observation of the patient, evaluation of cognition reveals recent and remote memory intact. Patient's complete Health Risk Assessment and screening values have been reviewed and are found in Flowsheets. The following problems were reviewed today and where indicated follow up appointments were made and/or referrals ordered.     Positive Risk Factor Screenings with Interventions:           Health Habits/Nutrition:  Health Habits/Nutrition  Do you exercise for at least 20 minutes 2-3 times per week?: (!) No  Have you lost any weight without trying in the past 3 months?: No  Do you eat only one meal per day?: No  Have you seen the dentist within the past year?: Yes  Body mass index: (!) 34.56  Health Habits/Nutrition Interventions:  · Inadequate physical activity:  patient agrees to exercise for at least 150 minutes/week  · Nutritional issues:  educational materials for healthy, well-balanced diet provided       Personalized Preventive Plan   Current Health Maintenance Status  Immunization History   Administered Date(s) Administered    Influenza Virus Vaccine 09/30/2014, 11/17/2020    Influenza, Quadv, IM, (6 mo and older Fluzone, Flulaval, Fluarix and 3 yrs and older Afluria) 11/28/2016    Pneumococcal Conjugate 13-valent ([id number]) 06/07/2018    Pneumococcal Polysaccharide ([id number]) 06/11/2019    Tdap (Boostrix, Adacel) 05/28/2014        Health Maintenance   Topic Date Due    COVID-19 Vaccine (1) Never done    Shingles Vaccine (1 of 2) Never done   [employer] Visit (AWV)  Never done    PSA counseling  01/17/2022    Lipid screen  01/20/2022    DTaP/Tdap/Td vaccine (2 - Td or Tdap) 05/28/2024    Colon cancer screen colonoscopy  06/16/2025    Flu vaccine  Completed    Pneumococcal 65+ years Vaccine  Completed    Hepatitis C screen  Completed    Hepatitis A vaccine  Aged Out    Hepatitis B vaccine  Aged Out    Hib vaccine  Aged Out    Meningococcal (ACWY) vaccine  Aged Out     Recommendations for [employer] Due: see orders and patient instructions/AVS.  . Recommended screening schedule for the next 5-10 years is provided to the patient in written form: see Patient Mirna Jessica was seen today for medicare awv. Diagnoses and all orders for this visit:      Routine general medical examination at a health care facility      Care plan reviewed with and given to patient.        Electronically signed by Marry Hay DO on 6/21/2021 at 3:23 PM

## 2021-06-21 NOTE — PATIENT INSTRUCTIONS
Personalized Preventive Plan for Andreas Arguello - 6/21/2021  Medicare offers a range of preventive health benefits. Some of the tests and screenings are paid in full while other may be subject to a deductible, co-insurance, and/or copay. Some of these benefits include a comprehensive review of your medical history including lifestyle, illnesses that may run in your family, and various assessments and screenings as appropriate. After reviewing your medical record and screening and assessments performed today your provider may have ordered immunizations, labs, imaging, and/or referrals for you. A list of these orders (if applicable) as well as your Preventive Care list are included within your After Visit Summary for your review. Other Preventive Recommendations:    · A preventive eye exam performed by an eye specialist is recommended every 1-2 years to screen for glaucoma; cataracts, macular degeneration, and other eye disorders. · A preventive dental visit is recommended every 6 months. · Try to get at least 150 minutes of exercise per week or 10,000 steps per day on a pedometer . · Order or download the FREE \"Exercise & Physical Activity: Your Everyday Guide\" from [employer]. Call 9-475.813.4796 or search [employer] online. · You need 8945-8998 mg of calcium and 5245-2803 IU of vitamin D per day. It is possible to meet your calcium requirement with diet alone, but a vitamin D supplement is usually necessary to meet this goal.  · When exposed to the sun, use a sunscreen that protects against both UVA and UVB radiation with an SPF of 30 or greater. Reapply every 2 to 3 hours or after sweating, drying off with a towel, or swimming. · Always wear a seat belt when traveling in a car. Always wear a helmet when riding a bicycle or motorcycle.

## 2022-01-09 ENCOUNTER — TELEPHONE (OUTPATIENT)
Dept: FAMILY MEDICINE CLINIC | Age: 67
End: 2022-01-09

## 2022-01-09 DIAGNOSIS — Z12.5 SPECIAL SCREENING FOR MALIGNANT NEOPLASM OF PROSTATE: ICD-10-CM

## 2022-01-09 DIAGNOSIS — I77.810 MILD DILATION OF ASCENDING AORTA (HCC): Primary | ICD-10-CM

## 2022-01-09 DIAGNOSIS — E78.2 MIXED HYPERLIPIDEMIA: ICD-10-CM

## 2022-01-09 DIAGNOSIS — I71.40 ABDOMINAL AORTIC ANEURYSM (AAA) WITHOUT RUPTURE: ICD-10-CM

## 2022-01-09 NOTE — TELEPHONE ENCOUNTER
Please let pt know that he is due for 1 year CTA chest to monitor TAA, end of JAN 2022    Due for routine fasting labs as well    Let me know if questions, thanks! Diagnosis Orders   1. Mild dilation of ascending aorta (HCC)  CTA CHEST W WO CONTRAST    CBC Auto Differential    Comprehensive Metabolic Panel    Lipid Panel    TSH with Reflex   2. Abdominal aortic aneurysm (AAA) without rupture (HCC)  CBC Auto Differential    Comprehensive Metabolic Panel    Lipid Panel    TSH with Reflex   3. Mixed hyperlipidemia  CBC Auto Differential    Comprehensive Metabolic Panel    Lipid Panel    TSH with Reflex   4.  Special screening for malignant neoplasm of prostate  PSA screening

## 2022-01-10 NOTE — TELEPHONE ENCOUNTER
Patient has been informed and voiced understanding and would like to have CTA    CTA scheduled at [place], [place]  [phone] arrive 6pm testing 615 pm drink 32 oz of water prior to appt bladder does not need to be full   Order faxed to  [phone]    Patient has been informed and voiced understanding

## 2022-01-21 NOTE — TELEPHONE ENCOUNTER
[facility] called stating pt is scheduled for a CTA this was never authorized   Pt has [employer] which requires a PA   appt cancelled  [facility] for pt to return call will need updated ins card

## 2022-01-24 NOTE — TELEPHONE ENCOUNTER
Pt informed and verbalized understanding.     Pt will fax his new [employer] ins card to us , CTA will need a PA completed and then rescheduled @ [facility]

## 2022-02-16 ENCOUNTER — PATIENT MESSAGE (OUTPATIENT)
Dept: FAMILY MEDICINE CLINIC | Age: 67
End: 2022-02-16

## 2022-02-16 ENCOUNTER — TELEPHONE (OUTPATIENT)
Dept: FAMILY MEDICINE CLINIC | Age: 67
End: 2022-02-16

## 2022-02-16 NOTE — TELEPHONE ENCOUNTER
Tried to call pt  LMOM that I was sending a mychart with several questions. Reprinted  CT order Placed at phone 2 tray .

## 2022-02-16 NOTE — TELEPHONE ENCOUNTER
----- Message from Kindrawilliam Gonsalez sent at 2/16/2022 11:45 AM EST -----  Subject: Message to Provider    QUESTIONS  Information for Provider? patient is returning provider call with the   answers to previous pt messages . 1) still Medicare A/B for your Primary   insurance ? yes 2) which [facility] location do you want this done   at , there are several locations in [facility] . pt states at the [facility] where he had his previous ct scan on 01/24/2021 also pt will get   his labs done prior to dominga of ct scan .   ---------------------------------------------------------------------------  --------------  CALL BACK INFO  What is the best way for the office to contact you? OK to leave message on   voicemail  Preferred Call Back Phone Number? [phone]  ---------------------------------------------------------------------------  --------------  SCRIPT ANSWERS  Relationship to Patient?  Self

## 2022-02-16 NOTE — TELEPHONE ENCOUNTER
See tele message from 1/9    I ordered labs and CTA (not US) to f/u on this. Where are with it all? Please let me know, thank you. If he has not done the labs yet, those will need done prior to CT    But please call pt and f/u and see if we can get this figured out, thanks!

## 2022-02-16 NOTE — TELEPHONE ENCOUNTER
From: Sharonda Minors  To: Dr. Bassem Kline: 2/16/2022 6:09 AM EST  Subject: [employer] for Gracie Lion for asyersum (sp?)    Dr. Matt Barraza,    I am due for my annual check-up of my asyersum (sp?) at [facility] in [facility]. I changed insurance and provided your office with a copy of my new medical card, which was at least two weeks ago. So please let me know when it is scheduled and let me know if you have any questions. Thanks.   Gracie Lion  [phone]

## 2022-03-10 ENCOUNTER — TELEPHONE (OUTPATIENT)
Dept: FAMILY MEDICINE CLINIC | Age: 67
End: 2022-03-10

## 2022-03-10 NOTE — TELEPHONE ENCOUNTER
----- Message from Sylvia Nunez, DO sent at 3/10/2022  6:29 AM EST -----  Please let pt know that we were finally able to get his labs faxed to us. They all look good. Was he able to get his CTA done of his chest? I can't find any results in the systems I have available. Let me know, thanks!

## 2022-03-14 NOTE — TELEPHONE ENCOUNTER
Hope from [place] calling to report that the PA for the CTA needs to be completed by noon 03.15.2022 or it will be canceled again. I reached out to the patient to confirm his insurance and patient states that he has Medicare part [id number] with [employer] and to see when he provided this office with the insurance cards as he previously mentioned   Medicare [address] have both elapsed but we do not have a copy of any of his insurance cards. Patient was informed of this and rudely states that he just got off of a plane and was getting his bags and stated that he tried to fax these items but the fax machine was busy. Patient asks Suzanne Cheese do you want me to do? If you want me to drive there to bring in the cards I can do that\"    I then informed the patient that he didn't have to drive here but unless we have this information as soon as possible in order to PA his testing the testing will be canceled.  I explained to the patient that he could take photos of all of his cards (front & back) and upload them to a patient message and send them that way

## 2022-03-14 NOTE — TELEPHONE ENCOUNTER
CTA CHEST W & WO/CONTRAST  PA APPROVAL AUTH # [id number]   VALID 03.14.2022-04.13.2022  HOPE HAS BEEN INFORMED AND UNDERSTANDS    Dar[address]48 FAXED TO [phone]

## 2022-03-17 DIAGNOSIS — I77.810 MILD DILATION OF ASCENDING AORTA (HCC): ICD-10-CM

## 2022-04-19 NOTE — PROGRESS NOTES
Chief Complaint   Patient presents with    Toe Pain     left 2nd toe pain , rt great toe     [place]     [place] - wants something to sleep     Other     ABD issues [facility]  [place], [employer].     Other     discuss Viagra     Follow-up     Chronic issues as noted below       History obtained from the patient. SUBJECTIVE:  Clarence Recio is a 74 y.o. male that presents today for     -L toe pain/R great toe pain:  Going on 2 wks  Mild pain  None at rest  Just with walking  No pain at rest  No bruising, redness      -Mild Dilated ascending aorta/AAA: found incidentally on echo JAN 2018. No CP/SOB. Repeat echo JAN 2019  And JAN 2020 is stable as was CTA chest JAN 2021 and MARCH 2022  US to screen for AAA showes small AAA at 2.9 cm. Denies CP/SOB      -HLD:    HPI:    Taking meds as prescribed ?: yes  Tolerating well ?: yes  Side Effects ?: denies  Muscle Pain?: denies  Working on TLCS ?: yes      -Bipolar INITIAL PRESENTATION: See's psych in [place], has been stable on depakote for many years. Denies manic or depressive sxs. See's therapist twice per month. Plans to con't to see psyc once per year, but would like me to fill depakote for him. Denies SI/HI.     UPDATE PRIOR Visit: Pt here feeling manic with high energy and lack of sleep. Denies SI/HI or risk taking behavior. At last visit pt related he was only taking depakote 250mg ER once daily. However, today he relates that he should actually be taking 1000mg daily, and was on that until he saw me back in May. Over the last month is when these sxs have started. Also relates that his psych in [place] has left practice and so is w/o psych at the moment. Working on getting another. Still seeing therapist regularly.      UPDATE PRIOR VISIT: Moods feel more stable, still having some issues with sleep. Anxiety is less, feeling less pressured. Denies SI/HI.  Has not made any headway into seeing psyciatry. Still seeing therapy. Did depakote level today, still pending.      UPDATE PRIOr VISIT: states has had some increased moodiness. Still not seeing psych. Not sleeping well over the last few months. No risk taking behaviors but is overall feeling manic. Admits to sporadically taking depakote.  Denies SI/HI. Had an incident at work that apparently has caused some issues prompting him to consider resuming his medications. He has an intake with Kim upcoming and is continuing with his counseling.      UPDATE PRIOR VISIT: seeing psych NP now. Apparently was seeing before, but just for therapy. He is asking if she can assume medical treatment for his bipolar. Manic sxs improved with starting of depakote. He did not do labs ordered last visit.      UPDATE PRIOR VISIT: stable, doing well. Following with pysch.  meds working well. No SI/HI     UPDATE TODAY: still following with psych. On depakokte; working well. Denies SI/HI      -Travel:  Going [place] [facility] and [facility] in June  Asking for some [place] for plan flight  Asking for travel advise and ATB prophy      -ER f/u:   In ER x 2  Last time was Sunday the 10th of May  Dx with acute diverticulitis  Treated with ATB  Has already seen GI and is scheduled for colonoscopy in May  Feeling much better      -ED:  Uses prn viagra  Works well  Needs RF      Age/Gender Health Maintenance    Lipid - ; ; HDL 41; TG 75 (APR 2022)    No components found for: CHLPL  Lab Results   Component Value Date    TRIG 68 01/20/2021    TRIG 65 06/11/2019    TRIG 84 03/30/2018     Lab Results   Component Value Date    HDL 40 01/20/2021    HDL 43 06/11/2019    HDL 42 03/30/2018     Lab Results   Component Value Date    LDLCALC 117 01/20/2021    LDLCALC 105 06/11/2019    LDLCALC 120 03/30/2018     Lab Results   Component Value Date    LABVLDL 17 03/30/2018    LABVLDL 20 11/12/2015       DM Screen - 91 (MARCH 2022)    Lab Results   Component Value Date    GLUCOSE 89 06/11/2019    GLUCOSE 97 03/30/2018       Colon Cancer Screening - NEG June 2015, repeat 10 years    Tetanus - UTD MAY 2014  Influenza Vaccine - Candidate FALL 2022  Pneumonia Vaccine - UTD PCV 13 (June 2018)/PPV23 in June 2019  Zoster - to get at pharmacy per medicare rules    PSA testing discussion - PSA 0.8 (APR 2022)  NOV 2015  PSA, Ultrasensitive 0.78 <4.0 ng/ml Final     Lab Results   Component Value Date    PSA 0.9 01/17/2021    PSA 1.25 (H) 06/11/2019    PSA 1.31 01/11/2017     Component      Latest Ref Rng & Units 3/30/2018          11:00 AM   PSA, Ultrasensitive      <=4.00 ng/mL 1.180       AAA Screening - non-smoker      Current Outpatient Medications   Medication Sig Dispense Refill    amoxicillin-clavulanate (AUGMENTIN) 875-125 MG per tablet TAKE 1 TABLET BY MOUTH EVERY 12 HOURS FOR 10 DAYS      HYDROcodone-acetaminophen (NORCO) 5-325 MG per tablet TAKE 1 TABLET BY MOUTH EVERY 6 HOURS FOR 3 DAYS AS NEEDED FOR PAIN      ondansetron (ZOFRAN) 4 MG tablet TAKE 1 TABLET BY MOUTH EVERY 8 HOURS FOR 5 DAYS AS NEEDED FOR NAUSEA OR VOMITING      amoxicillin-clavulanate (AUGMENTIN) 875-125 MG per tablet Take 1 tablet by mouth 2 times daily for 10 days 20 tablet 0    zolpidem (AMBIEN) 5 MG tablet Take 1 tablet by mouth nightly as needed for Sleep for up to 5 days. 5 tablet 0    sildenafil (VIAGRA) 50 MG tablet Take 1 tablet by mouth as needed for Erectile Dysfunction 9 tablet 3    atorvastatin (LIPITOR) 20 MG tablet Take 1 tablet by mouth daily 90 tablet 3    tiZANidine (ZANAFLEX) 4 MG tablet Take 1 tablet by mouth 3 times daily as needed (back pain/spasms) 90 tablet 3    traZODone (DESYREL) 100 MG tablet Take 100 mg by mouth nightly as needed       divalproex (DEPAKOTE ER) 250 MG extended release tablet Take 2 tablets by mouth daily (Patient taking differently: Take 750 mg by mouth daily ) 60 tablet 5    acetaminophen (TYLENOL) 325 MG tablet Take 650 mg by mouth every 6 hours as needed for Pain. No current facility-administered medications for this visit. Orders Placed This Encounter   Medications    amoxicillin-clavulanate (AUGMENTIN) 875-125 MG per tablet     Sig: Take 1 tablet by mouth 2 times daily for 10 days     Dispense:  20 tablet     Refill:  0    zolpidem (AMBIEN) 5 MG tablet     Sig: Take 1 tablet by mouth nightly as needed for Sleep for up to 5 days. Dispense:  5 tablet     Refill:  0    sildenafil (VIAGRA) 50 MG tablet     Sig: Take 1 tablet by mouth as needed for Erectile Dysfunction     Dispense:  9 tablet     Refill:  3         All medications reviewed and reconciled, including OTC and herbal medications. Updated list given to patient.        Patient Active Problem List   Diagnosis    Bipolar 1 disorder ([address]    History of basal cell carcinoma    Hyperlipidemia    Insomnia    Eczema    History of skin cancer    Primary osteoarthritis of left hand    History of compression fracture of lumbar spine, L1    History of small bowel obstruction    Thoracic aortic aneurysm (TAA) (HCC)    BPH (benign prostatic hyperplasia)    AAA (abdominal aortic aneurysm) (HCC)    Chronic low back pain       Past Medical History:   Diagnosis Date    AAA (abdominal aortic aneurysm) (HCC)     2.9 CM    Bipolar 1 disorder (HCC)     BPH (benign prostatic hyperplasia)     Chronic low back pain     Eczema     Hearing loss of both ears     Hearing aids    Hepatic cyst     Benign (see imaging)    History of basal cell carcinoma     Skin - left shoulder     History of compression fracture of lumbar spine, L1     While in the [employer] in his 17's    History of skin cancer 08/25/2015    History of small bowel obstruction     Many years ago    Hyperlipidemia     Insomnia     Primary osteoarthritis of left hand 08/27/2015    Thor[facility] aortic [facility] (TAA) ([facility] [address] [facility]        Past Surgical History:   Procedure Laterality Date    SKIN BIOPSY      SKIN CANCER EXCISION      TONSILLECTOMY AND ADENOIDECTOMY      WISDOM TOOTH EXTRACTION         No Known Allergies    Social History     Tobacco Use    Smoking status: Never Smoker    Smokeless tobacco: Never Used   Substance Use Topics    Alcohol use: No       Family History   Problem Relation Age of Onset    Heart Disease Mother     Cancer Father     Prostate Cancer Father [de-identified]    Colon Cancer Neg Hx        I have reviewed the patient's past medical history, past surgical history, allergies, medications, social and family history and I have made updates where appropriate. Review of Systems  Positive responses are highlighted in bold    Constitutional:  Fever, Chills, Night Sweats, Fatigue, Unexpected changes in weight  HENT:  Ear pain, Tinnitus, Nosebleeds, Trouble swallowing, Hearing loss, Sore throat  Cardiovascular:  Chest Pain, Palpitations, Orthopnea, Paroxysmal Nocturnal Dyspnea  Respiratory:  Cough, Wheezing, Shortness of breath, Chest tightness, Apnea  Gastrointestinal:  Nausea, Vomiting, Diarrhea, Constipation, Heartburn, Blood in stool  Genitourinary:  Difficulty or painful urination, Flank pain, Change in frequency, Urgency  Skin:  Color change, Rash, Itching, Wound  Musculoskeletal:  Joint pain, Back pain, Gait problems, Joint swelling, Myalgias  Neurological:  Dizziness, Headaches, Presyncope, Numbness, Seizures, Tremors  Endocrine:  Heat Intolerance, Cold Intolerance, Polydipsia, Polyphagia, Polyuria      PHYSICAL EXAM:  Vitals:    04/20/22 0856   BP: 115/75   Pulse: 90   Resp: 12   Temp: 98.4 °F (36.9 °C)   TempSrc: Oral   SpO2: 94%   Weight: 225 lb (102.1 kg)   Height: 5' 7\" (1.702 m)     Body mass index is 35.24 kg/m².        VS Reviewed  General Appearance: A&O x 3, No acute distress,well developed and well- nourished  Eyes: pupils equal, round, and reactive to light, extraocular eye movements intact, conjunctivae and eye lids without erythema  ENT: external ear and ear canal clear bilaterally, TMs intact and regular, nose without deformity, nasal mucosa and turbinates normal without polyps, oropharynx normal, dentition is normal for age  Neck: supple and non-tender without mass, no thyromegaly or thyroid nodules, no cervical lymphadenopathy  Pulmonary/Chest: clear to auscultation bilaterally- no wheezes, rales or rhonchi, normal air movement, no respiratory distress or retractions  Cardiovascular: S1 and S2 auscultated w/ RRR. No murmurs, rubs, clicks, or gallops, distal pulses intact. Abdomen: soft, non-tender, non-distended, bowl sounds physiologic,  no rebound or guarding, no masses or hernias noted. Liver and spleen without enlargement. Extremities: no cyanosis, clubbing or edema of the lower extremities. Skin: warm and dry, no rash or erythema. Excisions healing well. B Foot: normal exam, no swelling, tenderness, instability; ligaments intact, full range of motion of all ankle/foot joints      CTA Chest 3/17/2022  CTA Chest With and Without Contrast    Anatomical Region Laterality Modality   Chest  Computed Tomography       Impression  Performed by [facility] RADIOLOG[facility]  Ectasia of the ascending aorta measuring 4.3 cm, unchanged. Scarring in the right lung base with elevation the right hemidiaphragm as seen previously. 4 mm nodule in the right lower lobe, unchanged.  If there are significant risk factors for developing malignancy, 18-24 month follow-up CT could reassess. This report was generated entirely using voice recognition software.  If you have any questions or concerns, please contact the facility radiology department. Reading Workstation: [employer]  1. Pain in toes of both feet    Unclear etiology  Pretty neg exam  Will have him monitor for a few wks  If no better, he will call, and will get xray    2. Mild dilation of ascending aorta (HCC)    Stable  con't RF mod  CTA chest in [facility] 2022  In call back cue    3.  Abdominal aortic aneurysm (AAA) without rupture (HCC)    Small  Repeat June of this year  In call back cue    4. Mixed hyperlipidemia    Stable  con't lipitor    5. Bipolar 1 disorder (HCC)    Stable  con't depakote and psych f/u    6. Travel advice encounter    [id number] for [employer]  ATB written  Will review [place] [employer] travel website and update him on any further guidance via [employer]    OAARS reviewed and appropriate. Controlled Substances Monitoring:     Periodic Controlled Substance Monitoring: Possible medication side effects, risk of tolerance/dependence & alternative treatments discussed. ,No signs of potential drug abuse or diversion identified. Román Painting DO)    - zolpidem (AMBIEN) 5 MG tablet; Take 1 tablet by mouth nightly as needed for Sleep for up to 5 days. Dispense: 5 tablet; Refill: 0    7. Acute diverticulitis    Improved  F/u GI  High fiber diet  Reviewed ER precautions, pt understands. - amoxicillin-clavulanate (AUGMENTIN) 875-125 MG per tablet; Take 1 tablet by mouth 2 times daily for 10 days  Dispense: 20 tablet; Refill: 0    8. Erectile dysfunction, unspecified erectile dysfunction type    Stable  con't prn viagra    - sildenafil (VIAGRA) 50 MG tablet; Take 1 tablet by mouth as needed for Erectile Dysfunction  Dispense: 9 tablet; Refill: 3      DISPOSITION    Return if symptoms worsen or fail to improve. Lillian Robins released without restrictions. PATIENT COUNSELING    Nick received counseling on the following healthy behaviors: nutrition, exercise and medication adherence    Patient given educational materials on: See Attached    Barriers to learning and self management: none    Discussed use, benefit, and side effects of prescribed medications. Barriers to medication compliance addressed. All patient questions answered. Pt voiced understanding.        Electronically signed by Claude Pilot, DO on 4/20/2022 at 9:24 AM

## 2022-04-20 ENCOUNTER — OFFICE VISIT (OUTPATIENT)
Dept: FAMILY MEDICINE CLINIC | Age: 67
End: 2022-04-20
Payer: COMMERCIAL

## 2022-04-20 VITALS
SYSTOLIC BLOOD PRESSURE: 115 MMHG | RESPIRATION RATE: 12 BRPM | WEIGHT: 225 LBS | TEMPERATURE: 98.4 F | HEART RATE: 90 BPM | HEIGHT: 67 IN | DIASTOLIC BLOOD PRESSURE: 75 MMHG | BODY MASS INDEX: 35.31 KG/M2 | OXYGEN SATURATION: 94 %

## 2022-04-20 DIAGNOSIS — I71.40 ABDOMINAL AORTIC ANEURYSM (AAA) WITHOUT RUPTURE: ICD-10-CM

## 2022-04-20 DIAGNOSIS — I77.810 MILD DILATION OF ASCENDING AORTA (HCC): ICD-10-CM

## 2022-04-20 DIAGNOSIS — M79.674 PAIN IN TOES OF BOTH FEET: Primary | ICD-10-CM

## 2022-04-20 DIAGNOSIS — F31.9 BIPOLAR 1 DISORDER (HCC): ICD-10-CM

## 2022-04-20 DIAGNOSIS — N52.9 ERECTILE DYSFUNCTION, UNSPECIFIED ERECTILE DYSFUNCTION TYPE: ICD-10-CM

## 2022-04-20 DIAGNOSIS — K57.92 ACUTE DIVERTICULITIS: ICD-10-CM

## 2022-04-20 DIAGNOSIS — E78.2 MIXED HYPERLIPIDEMIA: ICD-10-CM

## 2022-04-20 DIAGNOSIS — M79.675 PAIN IN TOES OF BOTH FEET: Primary | ICD-10-CM

## 2022-04-20 DIAGNOSIS — Z71.84 TRAVEL ADVICE ENCOUNTER: ICD-10-CM

## 2022-04-20 PROCEDURE — G8427 DOCREV CUR MEDS BY ELIG CLIN: HCPCS | Performed by: FAMILY MEDICINE

## 2022-04-20 PROCEDURE — 1123F ACP DISCUSS/DSCN MKR DOCD: CPT | Performed by: FAMILY MEDICINE

## 2022-04-20 PROCEDURE — 4040F PNEUMOC VAC/ADMIN/RCVD: CPT | Performed by: FAMILY MEDICINE

## 2022-04-20 PROCEDURE — G8417 CALC BMI ABV UP PARAM F/U: HCPCS | Performed by: FAMILY MEDICINE

## 2022-04-20 PROCEDURE — 1036F TOBACCO NON-USER: CPT | Performed by: FAMILY MEDICINE

## 2022-04-20 PROCEDURE — 3017F COLORECTAL CA SCREEN DOC REV: CPT | Performed by: FAMILY MEDICINE

## 2022-04-20 PROCEDURE — 99214 OFFICE O/P EST MOD 30 MIN: CPT | Performed by: FAMILY MEDICINE

## 2022-04-20 RX ORDER — ONDANSETRON 4 MG/1
TABLET, FILM COATED ORAL
COMMUNITY
Start: 2022-03-27

## 2022-04-20 RX ORDER — AMOXICILLIN AND CLAVULANATE POTASSIUM 875; 125 MG/1; MG/1
TABLET, FILM COATED ORAL
COMMUNITY
Start: 2022-03-27

## 2022-04-20 RX ORDER — SILDENAFIL 50 MG/1
50 TABLET, FILM COATED ORAL PRN
Qty: 9 TABLET | Refills: 3 | Status: SHIPPED | OUTPATIENT
Start: 2022-04-20 | End: 2022-05-17 | Stop reason: SDUPTHER

## 2022-04-20 RX ORDER — HYDROCODONE BITARTRATE AND ACETAMINOPHEN 5; 325 MG/1; MG/1
TABLET ORAL
COMMUNITY
Start: 2022-03-27

## 2022-04-20 RX ORDER — ZOLPIDEM TARTRATE 5 MG/1
5 TABLET ORAL NIGHTLY PRN
Qty: 5 TABLET | Refills: 0 | Status: SHIPPED | OUTPATIENT
Start: 2022-04-20 | End: 2022-04-25

## 2022-04-20 RX ORDER — AMOXICILLIN AND CLAVULANATE POTASSIUM 875; 125 MG/1; MG/1
1 TABLET, FILM COATED ORAL 2 TIMES DAILY
Qty: 20 TABLET | Refills: 0 | Status: SHIPPED | OUTPATIENT
Start: 2022-04-20 | End: 2022-04-30

## 2022-04-21 ENCOUNTER — PATIENT MESSAGE (OUTPATIENT)
Dept: FAMILY MEDICINE CLINIC | Age: 67
End: 2022-04-21

## 2022-04-22 NOTE — TELEPHONE ENCOUNTER
From: Dr. Nadira Kathleen  To: Florencia Dixon  Sent: 4/21/2022 7:16 AM EDT  Subject: Travel    Good morning,    I reviewed the CDC recommendations, and for your destinations, it doesn't appear you need any specific additional vaccines or treatments (I.e malaria prophylaxis is not needed). I would recommend checking with [facility] COVID vaccine requirement as we discussed. Safe travels.      Vr/  Dr. Alley York

## 2022-05-17 DIAGNOSIS — N52.9 ERECTILE DYSFUNCTION, UNSPECIFIED ERECTILE DYSFUNCTION TYPE: Primary | ICD-10-CM

## 2022-05-17 DIAGNOSIS — N52.9 ERECTILE DYSFUNCTION, UNSPECIFIED ERECTILE DYSFUNCTION TYPE: ICD-10-CM

## 2022-05-17 RX ORDER — SILDENAFIL 50 MG/1
50 TABLET, FILM COATED ORAL PRN
Qty: 9 TABLET | Refills: 3 | Status: SHIPPED | OUTPATIENT
Start: 2022-05-17 | End: 2022-08-16 | Stop reason: SDUPTHER

## 2022-05-17 RX ORDER — SILDENAFIL 50 MG/1
50 TABLET, FILM COATED ORAL PRN
Qty: 27 TABLET | OUTPATIENT
Start: 2022-05-17

## 2022-05-17 NOTE — TELEPHONE ENCOUNTER
Britney Fernandez \"Eliot\"  Kathy Hoang, DO 2 minutes ago (7:35 AM)           Dr. Sushila Dennis gave me a prescription for Sildenafil There were three refills but they  on 2022. I have two pills left of the original prescription. Could you renew that subscription for me?  Thank you.     Kyler Chaudhary  Please approve or refuse Rx request:  Requested Prescriptions     Pending Prescriptions Disp Refills    sildenafil (VIAGRA) 50 MG tablet 9 tablet 3     Sig: Take 1 tablet by mouth as needed for Erectile Dysfunction       Next appointment:  Visit date not found

## 2022-08-16 DIAGNOSIS — N52.9 ERECTILE DYSFUNCTION, UNSPECIFIED ERECTILE DYSFUNCTION TYPE: ICD-10-CM

## 2022-08-16 RX ORDER — SILDENAFIL 50 MG/1
50 TABLET, FILM COATED ORAL PRN
Qty: 9 TABLET | Refills: 3 | Status: SHIPPED | OUTPATIENT
Start: 2022-08-16

## 2022-11-12 DIAGNOSIS — N52.9 ERECTILE DYSFUNCTION, UNSPECIFIED ERECTILE DYSFUNCTION TYPE: ICD-10-CM

## 2022-11-12 RX ORDER — SILDENAFIL 50 MG/1
50 TABLET, FILM COATED ORAL PRN
Qty: 9 TABLET | Refills: 3 | Status: SHIPPED | OUTPATIENT
Start: 2022-11-12

## 2023-02-15 NOTE — PROGRESS NOTES
Chief Complaint   Patient presents with    Tinnitus     \"Thumping\" in R ear for the last wk    Follow-up     Chronic issues noted below       History obtained from the patient. SUBJECTIVE:  Sabrina Benjamin is a 69 y.o. male that presents today for     -Thumping in R ear:  Noted thumping in R ear  Started after root canal last wk, but that was on L side  Comes and goes  Starts out fast, then slows down and goes away  Sounds like a pulse. However, what he's hearing in the ear does not correlate with his actual pulse when he takes it  Episode will last a few min and go away  Occurs 3 to 4 times per day  No HA  No vertigo  No stroke like sxs  Never had this before  No change in the last wk since it started      -Mild Dilated ascending aorta/AAA: found incidentally on echo JAN 2018. No CP/SOB. Repeat echo JAN 2019  And JAN 2020 is stable as was CTA chest JAN 2021 and MARCH 2022  US to screen for AAA showes small AAA at 2.9 cm June 2019. Denies CP/SOB      -HLD:    HPI:    Taking meds as prescribed ?: yes  Tolerating well ?: yes  Side Effects ?: denies  Muscle Pain?: denies  Working on TLCS ?: yes      -Bipolar INITIAL PRESENTATION: See's psych in [place], has been stable on depakote for many years. Denies manic or depressive sxs. See's therapist twice per month. Plans to con't to see psyc once per year, but would like me to fill depakote for him. Denies SI/HI. UPDATE PRIOR Visit: Pt here feeling manic with high energy and lack of sleep. Denies SI/HI or risk taking behavior. At last visit pt related he was only taking depakote 250mg ER once daily. However, today he relates that he should actually be taking 1000mg daily, and was on that until he saw me back in May. Over the last month is when these sxs have started. Also relates that his psych in [place] has left practice and so is w/o psych at the moment. Working on getting another. Still seeing therapist regularly.       UPDATE PRIOR VISIT: Moods feel more stable, still having some issues with sleep. Anxiety is less, feeling less pressured. Denies SI/HI. Has not made any headway into seeing psyciatry. Still seeing therapy. Did depakote level today, still pending. UPDATE PRIOr VISIT: states has had some increased moodiness. Still not seeing psych. Not sleeping well over the last few months. No risk taking behaviors but is overall feeling manic. Admits to sporadically taking depakote. Denies SI/HI. Had an incident at work that apparently has caused some issues prompting him to consider resuming his medications. He has an intake with Kim upcoming and is continuing with his counseling. UPDATE PRIOR VISIT: seeing psych NP now. Apparently was seeing before, but just for therapy. He is asking if she can assume medical treatment for his bipolar. Manic sxs improved with starting of depakote. He did not do labs ordered last visit. UPDATE PRIOR VISIT: stable, doing well. Following with pysch.  meds working well. No SI/HI     UPDATE TODAY: still following with psych. On depakokte; working well. Denies SI/HI        -ED:  Uses prn viagra  Works well  Needs RF        Age/Gender Health Maintenance    Lipid - ; ; HDL 41; TG 75 (APR 2022)    No components found for: CHLPL  Lab Results   Component Value Date    TRIG 68 01/20/2021    TRIG 65 06/11/2019    TRIG 84 03/30/2018     Lab Results   Component Value Date    HDL 40 01/20/2021    HDL 43 06/11/2019    HDL 42 03/30/2018     Lab Results   Component Value Date    LDLCALC 117 01/20/2021    LDLCALC 105 06/11/2019    LDLCALC 120 03/30/2018     Lab Results   Component Value Date    LABVLDL 17 03/30/2018    LABVLDL 20 11/12/2015       DM Screen - 91 (MARCH 2022)    Lab Results   Component Value Date/Time    GLUCOSE 89 06/11/2019 03:55 PM    GLUCOSE 97 03/30/2018 11:00 AM       Colon Cancer Screening - NEG MAY 2022, repeat 10 years.      Tetanus - UTD MAY 2014  Influenza Vaccine - Candidate FALL 2023  Pneumonia Vaccine - UTD PCV 13 (June 2018)/PPV23 in June 2019  Zoster - to get at pharmacy per medicare rules    PSA testing discussion - PSA 0.8 (APR 2022)  NOV 2015  PSA, Ultrasensitive 0.78 <4.0 ng/ml Final     Lab Results   Component Value Date    PSA 0.9 01/17/2021    PSA 1.25 (H) 06/11/2019    PSA 1.31 01/11/2017     Component      Latest Ref Rng & Units 3/30/2018          11:00 AM   PSA, Ultrasensitive      <=4.00 ng/mL 1.180       AAA Screening - non-smoker      Current Outpatient Medications   Medication Sig Dispense Refill    amoxicillin (AMOXIL) 500 MG capsule TAKE ONE CAPSULE BY MOUTH THREE TIMES DAILY UNTIL GONE      sildenafil (VIAGRA) 50 MG tablet Take 1 tablet by mouth as needed for Erectile Dysfunction 9 tablet 3    atorvastatin (LIPITOR) 20 MG tablet Take 1 tablet by mouth daily 90 tablet 3    tiZANidine (ZANAFLEX) 4 MG tablet Take 1 tablet by mouth 3 times daily as needed (back pain/spasms) 90 tablet 3    traZODone (DESYREL) 100 MG tablet Take 100 mg by mouth nightly as needed       divalproex (DEPAKOTE ER) 250 MG extended release tablet Take 2 tablets by mouth daily (Patient taking differently: Take 750 mg by mouth daily) 60 tablet 5    acetaminophen (TYLENOL) 325 MG tablet Take 650 mg by mouth every 6 hours as needed for Pain. No current facility-administered medications for this visit. No orders of the defined types were placed in this encounter. All medications reviewed and reconciled, including OTC and herbal medications. Updated list given to patient.        Patient Active Problem List   Diagnosis    Bipolar 1 disorder ([facility] [address]    History of basal cell carcinoma    Hyperlipidemia    Insomnia    Eczema    History of skin cancer    Primary osteoarthritis of left hand    History of compression fracture of lumbar spine, L1    History of small bowel obstruction    Thoracic aortic aneurysm (TAA)    BPH (benign prostatic hyperplasia)    AAA (abdominal aortic aneurysm)    Chronic low back pain       Past Medical History:   Diagnosis Date    AAA (abdominal aortic aneurysm)     2.9 CM    Bipolar 1 disorder (HCC)     BPH (benign prostatic hyperplasia)     Chronic low back pain     Eczema     Hearing loss of both ears     Hearing aids    Hepatic cyst     Benign (see imaging)    History of basal cell carcinoma     Skin - left shoulder     History of compression fracture of lumbar spine, L1     While in the [employer] in his 17's    History of skin cancer 08/25/2015    History of small bowel obstruction     Many years ago    Hyperlipidemia     Insomnia     Primary osteoarthritis of left hand 08/27/2015    Thoracic aortic aneurysm (TAA)        Past Surgical History:   Procedure Laterality Date    SKIN BIOPSY      SKIN CANCER EXCISION      TONSILLECTOMY AND ADENOIDECTOMY      WISDOM TOOTH EXTRACTION         No Known Allergies    Social History     Tobacco Use    Smoking status: Never    Smokeless tobacco: Never   Substance Use Topics    Alcohol use: No       Family History   Problem Relation Age of Onset    Heart Disease Mother     Cancer Father     Prostate Cancer Father [de-identified]    Colon Cancer Neg Hx        I have reviewed the patient's past medical history, past surgical history, allergies, medications, social and family history and I have made updates where appropriate.       Review of Systems  Positive responses are highlighted in bold    Constitutional:  Fever, Chills, Night Sweats, Fatigue, Unexpected changes in weight  HENT:  Ear pain, Tinnitus, Nosebleeds, Trouble swallowing, Hearing loss, Sore throat  Cardiovascular:  Chest Pain, Palpitations, Orthopnea, Paroxysmal Nocturnal Dyspnea  Respiratory:  Cough, Wheezing, Shortness of breath, Chest tightness, Apnea  Gastrointestinal:  Nausea, Vomiting, Diarrhea, Constipation, Heartburn, Blood in stool  Genitourinary:  Difficulty or painful urination, Flank pain, Change in frequency, Urgency  Skin:  Color change, Rash, Itching, Wound  Musculoskeletal:  Joint pain, Back pain, Gait problems, Joint swelling, Myalgias  Neurological:  Dizziness, Headaches, Presyncope, Numbness, Seizures, Tremors  Endocrine:  Heat Intolerance, Cold Intolerance, Polydipsia, Polyphagia, Polyuria      PHYSICAL EXAM:  Vitals:    02/16/23 1027   BP: 128/78   Pulse: 70   Resp: 12   Temp: 97.6 °F (36.4 °C)   SpO2: 92%   Weight: 231 lb 12.8 oz (105.1 kg)   Height: 5' 7\" (1.702 m)     Body mass index is 36.31 kg/m². VS Reviewed  General Appearance: A&O x 3, No acute distress,well developed and well- nourished  Eyes: pupils equal, round, and reactive to light, extraocular eye movements intact, conjunctivae and eye lids without erythema  ENT: external ear and ear canal clear bilaterally, TMs intact and regular, nose without deformity, nasal mucosa and turbinates normal without polyps, oropharynx normal, dentition is normal for age  Neck: supple and non-tender without mass, no thyromegaly or thyroid nodules, no cervical lymphadenopathy  Pulmonary/Chest: clear to auscultation bilaterally- no wheezes, rales or rhonchi, normal air movement, no respiratory distress or retractions  Cardiovascular: S1 and S2 auscultated w/ RRR. No murmurs, rubs, clicks, or gallops, distal pulses intact. Abdomen: soft, non-tender, non-distended, bowl sounds physiologic,  no rebound or guarding, no masses or hernias noted. Liver and spleen without enlargement. Extremities: no cyanosis, clubbing or edema of the lower extremities. Skin: warm and dry, no rash or erythema. Neuro Exam:   Cranial [id number] Intact.    Cranial nerve II: Normal Visual Acuity   Cranial nerve III: Pupils: equal, round, reactive to light   Cranial nerves III, IV, VI: Extraocular Movements: intact   Cranial nerve V: Facial sensation: intact   Cranial nerve VII:Facial strength: intact   Cranial nerve VIII: Hearing: intact   Cranial nerve IX: Palate Elevation intact bilaterally  Cranial nerve XI: Shoulder shrug intact bilaterally  Cranial nerve XII: Tongue movement: normal     Muscle Strength Testing    Deltoid Right 5/5  Left 5/5  Biceps Right 5/5  Left 5/5  Triceps Right 5/5  Left 5/5  Wrist Extensors Right  5/5  Left 5/5   Flexor Digitorum Profundus Right 5/5  Left 5/5  Hand Intrinsics Right 5/5  Left 5/5  Hip Flexors Right 5/5  Left 5/5  Quadriceps Right 5/5  Left 5/5  Anterior Tibialis Right 5/5  Left 5/5  Extensor Hallucis Longus Right 5/5  Left 5/5  Gastrocnemius/Soleus Right 5/5  Left 5/5     Sensory Testing    C5 Right 0=Normal; no sensory loss Left 0=Normal; no sensory loss  C6 Right 0=Normal; no sensory loss Left 0=Normal; no sensory loss  C7 Right 0=Normal; no sensory loss Left 0=Normal; no sensory loss  C8 Right 0=Normal; no sensory loss Left 0=Normal; no sensory loss  T1 Right 0=Normal; no sensory loss Left 0=Normal; no sensory loss    L2 Right 0=Normal; no sensory loss Left 0=Normal; no sensory loss  L3 Right 0=Normal; no sensory loss Left 0=Normal; no sensory loss  L4 Right 0=Normal; no sensory loss Left 0=Normal; no sensory loss  L5 Right 0=Normal; no sensory loss Left 0=Normal; no sensory loss  S1 Right 0=Normal; no sensory loss Left 0=Normal; no sensory loss     Cerebellar Testing    Finger to Nose:  Right  WNL Yes;  Left WNL  Yes  Heel to Bañuelos WNL: Right  WNL  Yes;  Left WNL  Yes     Deep Tendon Reflexes 2/4 equal and symmetric throughout   Clonus:  No  Decreased arm swing No   Shuffling gait No  Narrow base of support No  Able to stand without using arms  Yes  Bradykinesia  No  Tremor No  Increased tone at wrist especially with opening/closing opposite hand  No      CTA Chest 3/17/2022  CTA Chest With and Without Contrast    Anatomical Region Laterality Modality   Chest -- Computed Tomography       Impression  Performed by [facility]  Ectasia of the ascending aorta measuring 4.3 cm, unchanged. Scarring in the right lung base with elevation the right hemidiaphragm as seen previously.      4 mm nodule in the right lower lobe, unchanged. If there are significant risk factors for developing malignancy, 18-24 month follow-up CT could reassess. This report was generated entirely using voice recognition software. If you have any questions or concerns, please contact the facility radiology department. Reading Workstation: [employer]  1. Pulsatile tinnitus of right ear    Unclear etiology  Need to r/o vascular etiology, including aneurysm and malformation. CTA head/neck for that  BMP ordered  F/u based on CTA results  Reviewed ER precautions, pt understands. - CTA HEAD W WO CONTRAST; Future  - CTA NECK W WO CONTRAST; Future    2. Aneurysm of ascending aorta without rupture    Stable  con't RF mod  CTA chest in [facility] 2022 neg  Due for year f/u    - CTA CHEST W WO CONTRAST; Future  - CBC with Auto Differential; Future  - Comprehensive Metabolic Panel; Future  - Lipid Panel; Future    3. Abdominal aortic aneurysm (AAA) without rupture, unspecified part    Small  Repeat US ordered  If stable, may not need f/u    - [address]; Future  - CBC with Auto Differential; Future  - Comprehensive Metabolic Panel; Future  - Lipid Panel; Future    4. Mixed hyperlipidemia    Stable  con't lipitor    - CBC with Auto Differential; Future  - Comprehensive Metabolic Panel; Future  - Lipid Panel; Future    5. Bipolar 1 disorder (HCC)    Stable  con't depakote and psych f/u    6. Erectile dysfunction, unspecified erectile dysfunction type    Stable  con't prn viagra    7. Special screening for malignant neoplasm of prostate    - PSA Screening; Future      DISPOSITION    Return in about 4 weeks (around 3/16/2023) for f/u imaging studies, sooner as needed. Cassandra Paci released without restrictions.     PATIENT COUNSELING    Nick received counseling on the following healthy behaviors: nutrition, exercise and medication adherence    Patient given educational materials on: See Attached    Barriers to learning and self management: none    Discussed use, benefit, and side effects of prescribed medications. Barriers to medication compliance addressed. All patient questions answered. Pt voiced understanding.        Electronically signed by Quita Morales DO on 2/16/2023 at 1:53 PM

## 2023-02-16 ENCOUNTER — OFFICE VISIT (OUTPATIENT)
Dept: FAMILY MEDICINE CLINIC | Age: 68
End: 2023-02-16
Payer: COMMERCIAL

## 2023-02-16 VITALS
OXYGEN SATURATION: 92 % | BODY MASS INDEX: 36.38 KG/M2 | TEMPERATURE: 97.6 F | HEIGHT: 67 IN | HEART RATE: 70 BPM | DIASTOLIC BLOOD PRESSURE: 78 MMHG | SYSTOLIC BLOOD PRESSURE: 128 MMHG | WEIGHT: 231.8 LBS | RESPIRATION RATE: 12 BRPM

## 2023-02-16 DIAGNOSIS — H93.A1 PULSATILE TINNITUS OF RIGHT EAR: Primary | ICD-10-CM

## 2023-02-16 DIAGNOSIS — F31.9 BIPOLAR 1 DISORDER (HCC): ICD-10-CM

## 2023-02-16 DIAGNOSIS — N52.9 ERECTILE DYSFUNCTION, UNSPECIFIED ERECTILE DYSFUNCTION TYPE: ICD-10-CM

## 2023-02-16 DIAGNOSIS — I71.40 ABDOMINAL AORTIC ANEURYSM (AAA) WITHOUT RUPTURE, UNSPECIFIED PART: ICD-10-CM

## 2023-02-16 DIAGNOSIS — I71.21 ANEURYSM OF ASCENDING AORTA WITHOUT RUPTURE: ICD-10-CM

## 2023-02-16 DIAGNOSIS — E78.2 MIXED HYPERLIPIDEMIA: ICD-10-CM

## 2023-02-16 DIAGNOSIS — Z12.5 SPECIAL SCREENING FOR MALIGNANT NEOPLASM OF PROSTATE: ICD-10-CM

## 2023-02-16 PROCEDURE — 99214 OFFICE O/P EST MOD 30 MIN: CPT | Performed by: FAMILY MEDICINE

## 2023-02-16 PROCEDURE — 1123F ACP DISCUSS/DSCN MKR DOCD: CPT | Performed by: FAMILY MEDICINE

## 2023-02-16 RX ORDER — AMOXICILLIN 500 MG/1
CAPSULE ORAL
COMMUNITY
Start: 2023-02-09

## 2023-02-16 SDOH — ECONOMIC STABILITY: FOOD INSECURITY: WITHIN THE PAST 12 MONTHS, THE FOOD YOU BOUGHT JUST DIDN'T LAST AND YOU DIDN'T HAVE MONEY TO GET MORE.: NEVER TRUE

## 2023-02-16 SDOH — ECONOMIC STABILITY: HOUSING INSECURITY
IN THE LAST 12 MONTHS, WAS THERE A TIME WHEN YOU DID NOT HAVE A STEADY PLACE TO SLEEP OR SLEPT IN A SHELTER (INCLUDING NOW)?: NO

## 2023-02-16 SDOH — ECONOMIC STABILITY: INCOME INSECURITY: HOW HARD IS IT FOR YOU TO PAY FOR THE VERY BASICS LIKE FOOD, HOUSING, MEDICAL CARE, AND HEATING?: NOT HARD AT ALL

## 2023-02-16 SDOH — ECONOMIC STABILITY: FOOD INSECURITY: WITHIN THE PAST 12 MONTHS, YOU WORRIED THAT YOUR FOOD WOULD RUN OUT BEFORE YOU GOT MONEY TO BUY MORE.: NEVER TRUE

## 2023-02-16 ASSESSMENT — PATIENT HEALTH QUESTIONNAIRE - PHQ9
SUM OF ALL RESPONSES TO PHQ QUESTIONS 1-9: 3
7. TROUBLE CONCENTRATING ON THINGS, SUCH AS READING THE NEWSPAPER OR WATCHING TELEVISION: 0
2. FEELING DOWN, DEPRESSED OR HOPELESS: 0
9. THOUGHTS THAT YOU WOULD BE BETTER OFF DEAD, OR OF HURTING YOURSELF: 0
4. FEELING TIRED OR HAVING LITTLE ENERGY: 0
5. POOR APPETITE OR OVEREATING: 0
10. IF YOU CHECKED OFF ANY PROBLEMS, HOW DIFFICULT HAVE THESE PROBLEMS MADE IT FOR YOU TO DO YOUR WORK, TAKE CARE OF THINGS AT HOME, OR GET ALONG WITH OTHER PEOPLE: 0
6. FEELING BAD ABOUT YOURSELF - OR THAT YOU ARE A FAILURE OR HAVE LET YOURSELF OR YOUR FAMILY DOWN: 0
SUM OF ALL RESPONSES TO PHQ QUESTIONS 1-9: 3
SUM OF ALL RESPONSES TO PHQ QUESTIONS 1-9: 3
1. LITTLE INTEREST OR PLEASURE IN DOING THINGS: 3
8. MOVING OR SPEAKING SO SLOWLY THAT OTHER PEOPLE COULD HAVE NOTICED. OR THE OPPOSITE, BEING SO FIGETY OR RESTLESS THAT YOU HAVE BEEN MOVING AROUND A LOT MORE THAN USUAL: 0
SUM OF ALL RESPONSES TO PHQ9 QUESTIONS 1 & 2: 3
3. TROUBLE FALLING OR STAYING ASLEEP: 0
SUM OF ALL RESPONSES TO PHQ QUESTIONS 1-9: 3

## 2023-02-16 NOTE — PATIENT INSTRUCTIONS
LAB INSTRUCTIONS:    Please complete labs within 1 week(s). Please fast for 8 hours prior to lab collection. The clinic will call you within 1 week of collection. If you have not heard from us within that amount of time, please call us at [phone].

## 2023-02-17 DIAGNOSIS — N52.9 ERECTILE DYSFUNCTION, UNSPECIFIED ERECTILE DYSFUNCTION TYPE: ICD-10-CM

## 2023-02-17 RX ORDER — SILDENAFIL 50 MG/1
50 TABLET, FILM COATED ORAL PRN
Qty: 9 TABLET | Refills: 3 | Status: SHIPPED | OUTPATIENT
Start: 2023-02-17

## 2023-02-23 ENCOUNTER — TELEPHONE (OUTPATIENT)
Dept: FAMILY MEDICINE CLINIC | Age: 68
End: 2023-02-23

## 2023-02-23 NOTE — TELEPHONE ENCOUNTER
Left detailed message with nurse Carol explaining  is wanting to have all testing completed on the same day due to the contrast dye. Carol was asked to call the office and speak with Bita or Ines Bustamante concerning this issue. Paperwork is in the phone room.

## 2023-02-23 NOTE — TELEPHONE ENCOUNTER
Spoke with [employer], she will forward the request to the dr for review  They will call back with the decision

## 2023-02-27 NOTE — TELEPHONE ENCOUNTER
All testing scheduled @ [facility] 3/29/23 entrance #3  US AAA and CTA chest,head and neck  Pt to arrive @ 7:15 with a 7:30-8 start time     Pt to be NPO after midnight    Pt will need to drink 32 oz water after US is completed prior to CTA    Pt informed and verbalized understanding.    Approvals faxed to [phone]

## 2023-02-27 NOTE — TELEPHONE ENCOUNTER
Received approval from Luis huggins to schedule CTA chest prior to 3/15/23    [facility] asking pt if ok to reschedule test earlier   auth expires 3/23/22 on CTA chest

## 2023-03-02 NOTE — TELEPHONE ENCOUNTER
I doubt I'll have the results by then  We could move his apt to 5 to 7 days after the scans are done  Has he done his lab work yet?

## 2023-03-02 NOTE — TELEPHONE ENCOUNTER
Does the CTA need to be done before the patients F/U appointment 03.29.2023? Hello,      I can make that day. I wonder if Dr. Gaston Marques is thinking he could see my tests. I am having my tests on the morning of 3/29 and my appointment with him is the afternoon of 3/29, just a thought. Nieves Casanova     Appointment Request  (Newest Message First)  Nick Marks"  Patient Appointment Schedule Request Pool 8 minutes ago (10:11 AM)     Hello,     I can make that day. I wonder if Dr. Gaston Marques is thinking he could see my tests. I am having my tests on the morning of 3/29 and my appointment with him is the afternoon of 3/29, just a thought. Nieves Casanova       You  Krishan Lofty \"Eliot\" 2 days ago     AB         Future Appointments   Date Time Provider [place] Leyva   3/29/2023  1:20 PM Irving Baer DO [facility] \"[place]\"  Patient Appointment Schedule Request Pool 3 days ago     Hello again,     The best time for those two days would be anytime after 12:00 noon. Thanks. Kaley Kearney"  Patient Appointment Schedule Request Pool 3 days ago     Hello,     I could do March 29 or 30, does this work? Let me know. Nieves Casanova       You  Krishan Lofty \"[place]liot\" 3 days ago     AB  What would you like to change this appointment to?        Krishan Lofty \"Eliot\"  Patient Appointment Schedule Request Pool 5 days ago     Appointment Request From: Manju Mooney     With Provider: Irving Baer DO [address] [place] [address]     Preferred Date Range: Any     Preferred Times: Any Time     Reason for visit: Request an Appointment     Comments:  Need to change my appointment on March 21 at 2:20 Nieves Casanova

## 2023-03-28 LAB
ALBUMIN SERPL-MCNC: 4.6 G/DL
ALP BLD-CCNC: 57 U/L
ALT SERPL-CCNC: 21 U/L
ANION GAP SERPL CALCULATED.3IONS-SCNC: 2.1 MMOL/L
AST SERPL-CCNC: 20 U/L
BASOPHILS ABSOLUTE: NORMAL
BASOPHILS RELATIVE PERCENT: NORMAL
BILIRUB SERPL-MCNC: 0.5 MG/DL (ref 0.1–1.4)
BUN BLDV-MCNC: 20 MG/DL
CALCIUM SERPL-MCNC: 9 MG/DL
CHLORIDE BLD-SCNC: 103 MMOL/L
CHOLESTEROL, TOTAL: 165 MG/DL
CHOLESTEROL/HDL RATIO: NORMAL
CO2: 25 MMOL/L
CREAT SERPL-MCNC: 0.86 MG/DL
EGFR: 94
EOSINOPHILS ABSOLUTE: NORMAL
EOSINOPHILS RELATIVE PERCENT: NORMAL
GLUCOSE BLD-MCNC: 113 MG/DL
HCT VFR BLD CALC: 43.2 % (ref 41–53)
HDLC SERPL-MCNC: 40 MG/DL (ref 35–70)
HEMOGLOBIN: 14.2 G/DL (ref 13.5–17.5)
LDL CHOLESTEROL CALCULATED: 106 MG/DL (ref 0–160)
LYMPHOCYTES ABSOLUTE: NORMAL
LYMPHOCYTES RELATIVE PERCENT: NORMAL
MCH RBC QN AUTO: NORMAL PG
MCHC RBC AUTO-ENTMCNC: NORMAL G/DL
MCV RBC AUTO: NORMAL FL
MONOCYTES ABSOLUTE: NORMAL
MONOCYTES RELATIVE PERCENT: NORMAL
NEUTROPHILS ABSOLUTE: NORMAL
NEUTROPHILS RELATIVE PERCENT: NORMAL
NONHDLC SERPL-MCNC: NORMAL MG/DL
PLATELET # BLD: 211 K/ΜL
PMV BLD AUTO: NORMAL FL
POTASSIUM SERPL-SCNC: 5 MMOL/L
RBC # BLD: 4.84 10^6/ΜL
SODIUM BLD-SCNC: 141 MMOL/L
TOTAL PROTEIN: 6.8
TRIGL SERPL-MCNC: 101 MG/DL
VLDLC SERPL CALC-MCNC: 19 MG/DL
WBC # BLD: 6.8 10^3/ML

## 2023-03-28 NOTE — PROGRESS NOTES
Color change, Rash, Itching, Wound  Musculoskeletal:  Joint pain, Back pain, Gait problems, Joint swelling, Myalgias  Neurological:  Dizziness, Headaches, Presyncope, Numbness, Seizures, Tremors  Endocrine:  Heat Intolerance, Cold Intolerance, Polydipsia, Polyphagia, Polyuria      PHYSICAL EXAM:  Vitals:    03/29/23 1308   BP: 130/80   Pulse: 76   Resp: 16   Temp: 98.2 °F (36.8 °C)   SpO2: 97%   Weight: 232 lb 9.6 oz (105.5 kg)   Height: 5' 7\" (1.702 m)     Body mass index is 36.43 kg/m². VS Reviewed  General Appearance: A&O x 3, No acute distress,well developed and well- nourished  Eyes: pupils equal, round, and reactive to light, extraocular eye movements intact, conjunctivae and eye lids without erythema  ENT: external ear and ear canal clear bilaterally, TMs intact and regular, nose without deformity, nasal mucosa and turbinates normal without polyps, oropharynx normal, dentition is normal for age  Neck: supple and non-tender without mass, no thyromegaly or thyroid nodules, no cervical lymphadenopathy  Pulmonary/Chest: clear to auscultation bilaterally- no wheezes, rales or rhonchi, normal air movement, no respiratory distress or retractions  Cardiovascular: S1 and S2 auscultated w/ RRR. No murmurs, rubs, clicks, or gallops, distal pulses intact. Abdomen: soft, non-tender, non-distended, bowl sounds physiologic,  no rebound or guarding, no masses or hernias noted. Liver and spleen without enlargement. Extremities: no cyanosis, clubbing or edema of the lower extremities. Skin: warm and dry, no rash or erythema. CTA Chest 3/17/2022  CTA Chest With and Without Contrast    Anatomical Region Laterality Modality   Chest -- Computed Tomography       Impression  Performed by [facility] RADIOLOG[facility]  Ectasia of the ascending aorta measuring 4.3 cm, unchanged. Scarring in the right lung base with elevation the right hemidiaphragm as seen previously.      4 mm nodule in the right lower lobe,

## 2023-03-29 ENCOUNTER — OFFICE VISIT (OUTPATIENT)
Dept: FAMILY MEDICINE CLINIC | Age: 68
End: 2023-03-29
Payer: COMMERCIAL

## 2023-03-29 VITALS
HEIGHT: 67 IN | BODY MASS INDEX: 36.51 KG/M2 | RESPIRATION RATE: 16 BRPM | TEMPERATURE: 98.2 F | DIASTOLIC BLOOD PRESSURE: 80 MMHG | OXYGEN SATURATION: 97 % | WEIGHT: 232.6 LBS | HEART RATE: 76 BPM | SYSTOLIC BLOOD PRESSURE: 130 MMHG

## 2023-03-29 DIAGNOSIS — I71.40 ABDOMINAL AORTIC ANEURYSM (AAA) WITHOUT RUPTURE, UNSPECIFIED PART (HCC): ICD-10-CM

## 2023-03-29 DIAGNOSIS — E78.2 MIXED HYPERLIPIDEMIA: ICD-10-CM

## 2023-03-29 DIAGNOSIS — I71.21 ANEURYSM OF ASCENDING AORTA WITHOUT RUPTURE (HCC): ICD-10-CM

## 2023-03-29 DIAGNOSIS — H93.A1 PULSATILE TINNITUS OF RIGHT EAR: Primary | ICD-10-CM

## 2023-03-29 PROCEDURE — 1123F ACP DISCUSS/DSCN MKR DOCD: CPT | Performed by: FAMILY MEDICINE

## 2023-03-29 PROCEDURE — 99214 OFFICE O/P EST MOD 30 MIN: CPT | Performed by: FAMILY MEDICINE

## 2023-03-31 ENCOUNTER — TELEPHONE (OUTPATIENT)
Dept: FAMILY MEDICINE CLINIC | Age: 68
End: 2023-03-31

## 2023-03-31 NOTE — TELEPHONE ENCOUNTER
----- Message from Hector Dacosta, DO sent at 3/31/2023 10:52 AM EDT -----  Please let pt know that CTA of head and neck showed no abnormalities at all  CTA of chest shows his aneurysm slight larger at 4.5 cm from 4.3cm. Current guidelines, based on being 4.5 cm, would be to repeat this in 6 months in stead of 1 year. No change in meds. No other intervention needed other than watching it a little closer. AAA US showed no aneurysm on this scan, which is great. No f/u imaging needed for this. Will call him to get CTA chest done as time gets closer. Let me know if questions, thanks!

## 2023-08-01 ENCOUNTER — TELEPHONE (OUTPATIENT)
Dept: FAMILY MEDICINE CLINIC | Age: 68
End: 2023-08-01

## 2023-08-01 NOTE — TELEPHONE ENCOUNTER
Blood in stool, noticed today. Stool was bright red, within the stool and on toilet paper when pt wiped. There was no blood in the toilet. Pt states this happened about 5 years ago due to hemorrhoids. Pt has no other symptoms and has not had to \"force\" himself to have a bowel movement.     Please advise

## 2023-08-02 ENCOUNTER — OFFICE VISIT (OUTPATIENT)
Dept: FAMILY MEDICINE CLINIC | Age: 68
End: 2023-08-02
Payer: COMMERCIAL

## 2023-08-02 VITALS
RESPIRATION RATE: 16 BRPM | TEMPERATURE: 97.3 F | HEART RATE: 73 BPM | WEIGHT: 223 LBS | BODY MASS INDEX: 35 KG/M2 | DIASTOLIC BLOOD PRESSURE: 78 MMHG | SYSTOLIC BLOOD PRESSURE: 130 MMHG | HEIGHT: 67 IN | OXYGEN SATURATION: 94 %

## 2023-08-02 DIAGNOSIS — K62.5 BRBPR (BRIGHT RED BLOOD PER RECTUM): Primary | ICD-10-CM

## 2023-08-02 PROCEDURE — 99213 OFFICE O/P EST LOW 20 MIN: CPT | Performed by: FAMILY MEDICINE

## 2023-08-02 PROCEDURE — 1123F ACP DISCUSS/DSCN MKR DOCD: CPT | Performed by: FAMILY MEDICINE

## 2023-09-14 ENCOUNTER — TELEPHONE (OUTPATIENT)
Dept: FAMILY MEDICINE CLINIC | Age: 68
End: 2023-09-14

## 2023-09-14 DIAGNOSIS — I71.21 ANEURYSM OF ASCENDING AORTA WITHOUT RUPTURE (HCC): Primary | Chronic | ICD-10-CM

## 2023-09-14 NOTE — TELEPHONE ENCOUNTER
Please let pt know that he is due for 6 month f/u CTA chest to monitor his Thoracic Aneurysm. Due end of SEPT 2023  Pt typically get done on [place]. Will need BMP too  Let me know if questions, thanks! Diagnosis Orders   1.  Aneurysm of ascending aorta without rupture [facility])  CTA CHEST W WO CONTRAST    Basic Metabolic Panel        Future Appointments   Date Time Provider [address]   4/3/2024  1:20 PM Harjit Hilton, [address] [place] [address] Dr Esposito

## 2023-09-15 NOTE — TELEPHONE ENCOUNTER
Left message on answering machine requesting pt to call back at earliest convenience.      Transfer to Central scheduling please

## 2023-09-15 NOTE — TELEPHONE ENCOUNTER
Pt informed and understanding   Faxed order over to   [facility].     Fax: [phone]

## 2023-10-17 ENCOUNTER — HOSPITAL ENCOUNTER (OUTPATIENT)
Dept: CT IMAGING | Age: 68
Discharge: HOME | End: 2023-10-17
Attending: FAMILY MEDICINE
Payer: MEDICARE

## 2023-10-17 DIAGNOSIS — I71.21 ANEURYSM OF ASCENDING AORTA WITHOUT RUPTURE (HCC): Chronic | ICD-10-CM

## 2023-10-17 LAB — POC CREATININE WHOLE BLOOD: 1 MG/DL (ref 0.5–1.2)

## 2023-10-17 PROCEDURE — 82565 ASSAY OF CREATININE: CPT

## 2023-10-17 PROCEDURE — 6360000004 HC RX CONTRAST MEDICATION: Performed by: FAMILY MEDICINE

## 2023-10-17 PROCEDURE — 71275 CT ANGIOGRAPHY CHEST: CPT

## 2023-10-17 RX ADMIN — IOPAMIDOL 80 ML: 755 INJECTION, SOLUTION INTRAVENOUS at 09:59

## 2023-10-18 ENCOUNTER — TELEPHONE (OUTPATIENT)
Dept: FAMILY MEDICINE CLINIC | Age: 68
End: 2023-10-18

## 2023-10-18 NOTE — TELEPHONE ENCOUNTER
----- Message from Kevyn Richardson,  sent at 10/17/2023  6:15 PM EDT -----  Please let pt know that CTA chest is stable  Will plan to repeat again in 6 months  Will call him to get done as time gets closer  Let me know if questions, thanks!

## 2023-11-28 DIAGNOSIS — N52.9 ERECTILE DYSFUNCTION, UNSPECIFIED ERECTILE DYSFUNCTION TYPE: ICD-10-CM

## 2023-11-28 RX ORDER — SILDENAFIL 50 MG/1
50 TABLET, FILM COATED ORAL PRN
Qty: 9 TABLET | Refills: 3 | Status: SHIPPED | OUTPATIENT
Start: 2023-11-28

## 2023-11-28 NOTE — TELEPHONE ENCOUNTER
Recent Visits  Date Type Provider Dept   08/02/23 Office Visit [facility], DO [facility]   03/29/23 Office Visit [facility], DO [facility]   02/16/23 Office Visit [facility], DO [facility]   Showing recent visits within past 540 days with a meds authorizing provider and meeting all other requirements  Future Appointments  Date Type Provider Dept   04/03/24 Appointment [facility], DO [facility]   Showing future appointments within next 150 days with a meds authorizing provider and meeting all other requirements

## 2024-03-21 ENCOUNTER — TELEPHONE (OUTPATIENT)
Dept: FAMILY MEDICINE CLINIC | Age: 69
End: 2024-03-21

## 2024-03-21 DIAGNOSIS — I71.21 ANEURYSM OF ASCENDING AORTA WITHOUT RUPTURE (HCC): Primary | Chronic | ICD-10-CM

## 2024-03-21 DIAGNOSIS — Z12.5 SPECIAL SCREENING FOR MALIGNANT NEOPLASM OF PROSTATE: ICD-10-CM

## 2024-03-21 DIAGNOSIS — E78.2 MIXED HYPERLIPIDEMIA: ICD-10-CM

## 2024-03-21 NOTE — TELEPHONE ENCOUNTER
Please let pt know that he is due for 6 month f/u CTA chest, mid April, to monitor TAA.     Also due for routine lab work. Will need done as part of scheduling test.   Labs are fasting    Let me know if questions, thanks!     Diagnosis Orders   1. Aneurysm of ascending aorta without rupture (HCC)  CBC with Auto Differential    Comprehensive Metabolic Panel    Lipid Panel    TSH with Reflex    CTA CHEST W WO CONTRAST      2. Mixed hyperlipidemia  CBC with Auto Differential    Comprehensive Metabolic Panel    Lipid Panel    TSH with Reflex      3. Special screening for malignant neoplasm of prostate  PSA Screening        Future Appointments   Date Time Provider Department Center   4/3/2024  1:20 PM Mike Painting, DO [facility] [place]

## 2024-03-21 NOTE — TELEPHONE ENCOUNTER
Pt informed and voiced understanding.     Will get labs completed.     He would like the CTA to be completed at [facility].    This will need a PA. Placed in PA tray.    Then faxed to [phone]

## 2024-03-21 NOTE — TELEPHONE ENCOUNTER
Not able to leave message V/M full   Lab slip mailed to pt wrote note for patient to Please call [phone] to schedule CTA Chest.

## 2024-04-06 SDOH — ECONOMIC STABILITY: INCOME INSECURITY: HOW HARD IS IT FOR YOU TO PAY FOR THE VERY BASICS LIKE FOOD, HOUSING, MEDICAL CARE, AND HEATING?: NOT HARD AT ALL

## 2024-04-06 SDOH — ECONOMIC STABILITY: FOOD INSECURITY: WITHIN THE PAST 12 MONTHS, THE FOOD YOU BOUGHT JUST DIDN'T LAST AND YOU DIDN'T HAVE MONEY TO GET MORE.: NEVER TRUE

## 2024-04-06 SDOH — ECONOMIC STABILITY: FOOD INSECURITY: WITHIN THE PAST 12 MONTHS, YOU WORRIED THAT YOUR FOOD WOULD RUN OUT BEFORE YOU GOT MONEY TO BUY MORE.: NEVER TRUE

## 2024-04-06 SDOH — ECONOMIC STABILITY: TRANSPORTATION INSECURITY
IN THE PAST 12 MONTHS, HAS LACK OF TRANSPORTATION KEPT YOU FROM MEETINGS, WORK, OR FROM GETTING THINGS NEEDED FOR DAILY LIVING?: NO

## 2024-04-06 ASSESSMENT — PATIENT HEALTH QUESTIONNAIRE - PHQ9
2. FEELING DOWN, DEPRESSED OR HOPELESS: NOT AT ALL
7. TROUBLE CONCENTRATING ON THINGS, SUCH AS READING THE NEWSPAPER OR WATCHING TELEVISION: NOT AT ALL
7. TROUBLE CONCENTRATING ON THINGS, SUCH AS READING THE NEWSPAPER OR WATCHING TELEVISION: NOT AT ALL
SUM OF ALL RESPONSES TO PHQ QUESTIONS 1-9: 0
4. FEELING TIRED OR HAVING LITTLE ENERGY: NOT AT ALL
6. FEELING BAD ABOUT YOURSELF - OR THAT YOU ARE A FAILURE OR HAVE LET YOURSELF OR YOUR FAMILY DOWN: NOT AT ALL
1. LITTLE INTEREST OR PLEASURE IN DOING THINGS: NOT AT ALL
3. TROUBLE FALLING OR STAYING ASLEEP: NOT AT ALL
5. POOR APPETITE OR OVEREATING: NOT AT ALL
9. THOUGHTS THAT YOU WOULD BE BETTER OFF DEAD, OR OF HURTING YOURSELF: NOT AT ALL
8. MOVING OR SPEAKING SO SLOWLY THAT OTHER PEOPLE COULD HAVE NOTICED. OR THE OPPOSITE - BEING SO FIDGETY OR RESTLESS THAT YOU HAVE BEEN MOVING AROUND A LOT MORE THAN USUAL: NOT AT ALL
SUM OF ALL RESPONSES TO PHQ QUESTIONS 1-9: 0
4. FEELING TIRED OR HAVING LITTLE ENERGY: NOT AT ALL
1. LITTLE INTEREST OR PLEASURE IN DOING THINGS: NOT AT ALL
5. POOR APPETITE OR OVEREATING: NOT AT ALL
2. FEELING DOWN, DEPRESSED OR HOPELESS: NOT AT ALL
SUM OF ALL RESPONSES TO PHQ QUESTIONS 1-9: 0
3. TROUBLE FALLING OR STAYING ASLEEP: NOT AT ALL
10. IF YOU CHECKED OFF ANY PROBLEMS, HOW DIFFICULT HAVE THESE PROBLEMS MADE IT FOR YOU TO DO YOUR WORK, TAKE CARE OF THINGS AT HOME, OR GET ALONG WITH OTHER PEOPLE: NOT DIFFICULT AT ALL
10. IF YOU CHECKED OFF ANY PROBLEMS, HOW DIFFICULT HAVE THESE PROBLEMS MADE IT FOR YOU TO DO YOUR WORK, TAKE CARE OF THINGS AT HOME, OR GET ALONG WITH OTHER PEOPLE: NOT DIFFICULT AT ALL
8. MOVING OR SPEAKING SO SLOWLY THAT OTHER PEOPLE COULD HAVE NOTICED. OR THE OPPOSITE, BEING SO FIGETY OR RESTLESS THAT YOU HAVE BEEN MOVING AROUND A LOT MORE THAN USUAL: NOT AT ALL
6. FEELING BAD ABOUT YOURSELF - OR THAT YOU ARE A FAILURE OR HAVE LET YOURSELF OR YOUR FAMILY DOWN: NOT AT ALL
SUM OF ALL RESPONSES TO PHQ QUESTIONS 1-9: 0
9. THOUGHTS THAT YOU WOULD BE BETTER OFF DEAD, OR OF HURTING YOURSELF: NOT AT ALL
SUM OF ALL RESPONSES TO PHQ9 QUESTIONS 1 & 2: 0
SUM OF ALL RESPONSES TO PHQ QUESTIONS 1-9: 0

## 2024-04-07 NOTE — PROGRESS NOTES
SI/HI. Had an incident at work that apparently has caused some issues prompting him to consider resuming his medications. He has an intake with Kim upcoming and is continuing with his counseling.      UPDATE PRIOR VISIT: seeing psych NP now. Apparently was seeing before, but just for therapy. He is asking if she can assume medical treatment for his bipolar. Manic sxs improved with starting of depakote. He did not do labs ordered last visit.      UPDATE PRIOR VISIT: stable, doing well. Following with pysch.  meds working well. No SI/HI     UPDATE TODAY: still following with psych. On depakokte; working well. Denies SI/HI      -ED:  Uses prn viagra  Works well  UTD RF      Age/Gender Health Maintenance    Lipid -   ; ; HDL 38; TG 92 (MARCH 2024)    No components found for: \"CHLPL\"  Lab Results   Component Value Date    TRIG 101 03/28/2023    TRIG 68 01/20/2021    TRIG 65 06/11/2019     Lab Results   Component Value Date    HDL 40 03/28/2023    HDL 40 01/20/2021    HDL 43 06/11/2019     Lab Results   Component Value Date    LDLCALC 106 03/28/2023    LDLCALC 117 01/20/2021    LDLCALC 105 06/11/2019       DM Screen -    (MARCH 2024)    Lab Results   Component Value Date/Time    GLUCOSE 113 03/28/2023 12:00 AM    GLUCOSE 97 03/30/2018 11:00 AM       Colon Cancer Screening - NEG MAY 2022, repeat 10 years.     Tetanus - UTD MAY 2014  Influenza Vaccine - Candidate FALL 2024  Pneumonia Vaccine - UTD PCV 13 (June 2018)/PPV23 in June 2019  Zoster - to get at pharmacy per medicare rules    PSA testing discussion -   PSA: 1.1 (MARCH 2024)  PSA: 0.9 (MARCH 2023)  PSA 0.8 (APR 2022)  NOV 2015  PSA, Ultrasensitive 0.78 <4.0 ng/ml Final     Lab Results   Component Value Date    PSA 0.9 01/17/2021    PSA 1.25 (H) 06/11/2019    PSA 1.31 01/11/2017     Component      Latest Ref Rng & Units 3/30/2018          11:00 AM   PSA, Ultrasensitive      <=4.00 ng/mL 1.180       AAA Screening - non-smoker      Current

## 2024-04-09 ENCOUNTER — OFFICE VISIT (OUTPATIENT)
Dept: FAMILY MEDICINE CLINIC | Age: 69
End: 2024-04-09
Payer: MEDICARE

## 2024-04-09 VITALS
HEART RATE: 82 BPM | BODY MASS INDEX: 34.81 KG/M2 | RESPIRATION RATE: 18 BRPM | SYSTOLIC BLOOD PRESSURE: 128 MMHG | DIASTOLIC BLOOD PRESSURE: 76 MMHG | OXYGEN SATURATION: 98 % | HEIGHT: 67 IN | TEMPERATURE: 97.8 F | WEIGHT: 221.8 LBS

## 2024-04-09 DIAGNOSIS — Z00.00 MEDICARE ANNUAL WELLNESS VISIT, SUBSEQUENT: Primary | ICD-10-CM

## 2024-04-09 DIAGNOSIS — E78.2 MIXED HYPERLIPIDEMIA: ICD-10-CM

## 2024-04-09 DIAGNOSIS — N52.9 ERECTILE DYSFUNCTION, UNSPECIFIED ERECTILE DYSFUNCTION TYPE: ICD-10-CM

## 2024-04-09 DIAGNOSIS — R73.9 HYPERGLYCEMIA: ICD-10-CM

## 2024-04-09 DIAGNOSIS — I71.40 ABDOMINAL AORTIC ANEURYSM (AAA) WITHOUT RUPTURE, UNSPECIFIED PART (HCC): ICD-10-CM

## 2024-04-09 DIAGNOSIS — F31.9 BIPOLAR 1 DISORDER (HCC): Chronic | ICD-10-CM

## 2024-04-09 DIAGNOSIS — I71.21 ANEURYSM OF ASCENDING AORTA WITHOUT RUPTURE (HCC): Chronic | ICD-10-CM

## 2024-04-09 PROCEDURE — G0439 PPPS, SUBSEQ VISIT: HCPCS | Performed by: FAMILY MEDICINE

## 2024-04-09 PROCEDURE — 99213 OFFICE O/P EST LOW 20 MIN: CPT | Performed by: FAMILY MEDICINE

## 2024-04-09 PROCEDURE — G8417 CALC BMI ABV UP PARAM F/U: HCPCS | Performed by: FAMILY MEDICINE

## 2024-04-09 PROCEDURE — 3017F COLORECTAL CA SCREEN DOC REV: CPT | Performed by: FAMILY MEDICINE

## 2024-04-09 PROCEDURE — 1123F ACP DISCUSS/DSCN MKR DOCD: CPT | Performed by: FAMILY MEDICINE

## 2024-04-09 PROCEDURE — 1036F TOBACCO NON-USER: CPT | Performed by: FAMILY MEDICINE

## 2024-04-09 PROCEDURE — G8427 DOCREV CUR MEDS BY ELIG CLIN: HCPCS | Performed by: FAMILY MEDICINE

## 2024-04-09 ASSESSMENT — PATIENT HEALTH QUESTIONNAIRE - PHQ9
5. POOR APPETITE OR OVEREATING: NOT AT ALL
9. THOUGHTS THAT YOU WOULD BE BETTER OFF DEAD, OR OF HURTING YOURSELF: NOT AT ALL
SUM OF ALL RESPONSES TO PHQ9 QUESTIONS 1 & 2: 0
SUM OF ALL RESPONSES TO PHQ QUESTIONS 1-9: 0
2. FEELING DOWN, DEPRESSED OR HOPELESS: NOT AT ALL
SUM OF ALL RESPONSES TO PHQ QUESTIONS 1-9: 0
SUM OF ALL RESPONSES TO PHQ QUESTIONS 1-9: 0
3. TROUBLE FALLING OR STAYING ASLEEP: NOT AT ALL
SUM OF ALL RESPONSES TO PHQ QUESTIONS 1-9: 0
4. FEELING TIRED OR HAVING LITTLE ENERGY: NOT AT ALL
1. LITTLE INTEREST OR PLEASURE IN DOING THINGS: NOT AT ALL
7. TROUBLE CONCENTRATING ON THINGS, SUCH AS READING THE NEWSPAPER OR WATCHING TELEVISION: NOT AT ALL
10. IF YOU CHECKED OFF ANY PROBLEMS, HOW DIFFICULT HAVE THESE PROBLEMS MADE IT FOR YOU TO DO YOUR WORK, TAKE CARE OF THINGS AT HOME, OR GET ALONG WITH OTHER PEOPLE: NOT DIFFICULT AT ALL
8. MOVING OR SPEAKING SO SLOWLY THAT OTHER PEOPLE COULD HAVE NOTICED. OR THE OPPOSITE, BEING SO FIGETY OR RESTLESS THAT YOU HAVE BEEN MOVING AROUND A LOT MORE THAN USUAL: NOT AT ALL
6. FEELING BAD ABOUT YOURSELF - OR THAT YOU ARE A FAILURE OR HAVE LET YOURSELF OR YOUR FAMILY DOWN: NOT AT ALL

## 2024-04-09 ASSESSMENT — LIFESTYLE VARIABLES
HOW MANY STANDARD DRINKS CONTAINING ALCOHOL DO YOU HAVE ON A TYPICAL DAY: PATIENT DOES NOT DRINK
HOW OFTEN DO YOU HAVE A DRINK CONTAINING ALCOHOL: NEVER

## 2024-05-06 ENCOUNTER — TELEPHONE (OUTPATIENT)
Dept: FAMILY MEDICINE CLINIC | Age: 69
End: 2024-05-06

## 2024-05-06 NOTE — TELEPHONE ENCOUNTER
----- Message from Mike Painting, DO sent at 5/6/2024  6:49 AM EDT -----  Please let pt know that CTA chest shows stable TAA at 4.4 cm  Based on this size, we can repeat CTA in 1 year instead of 6 months  Will call him to get done as time gets closer  Let me know if questions, thanks!

## 2024-05-14 ENCOUNTER — OFFICE VISIT (OUTPATIENT)
Dept: FAMILY MEDICINE CLINIC | Age: 69
End: 2024-05-14
Payer: MEDICARE

## 2024-05-14 VITALS
TEMPERATURE: 97.9 F | SYSTOLIC BLOOD PRESSURE: 136 MMHG | BODY MASS INDEX: 35.16 KG/M2 | WEIGHT: 224 LBS | HEIGHT: 67 IN | RESPIRATION RATE: 18 BRPM | HEART RATE: 74 BPM | DIASTOLIC BLOOD PRESSURE: 74 MMHG | OXYGEN SATURATION: 96 %

## 2024-05-14 DIAGNOSIS — J40 SINOBRONCHITIS: Primary | ICD-10-CM

## 2024-05-14 DIAGNOSIS — J32.9 SINOBRONCHITIS: Primary | ICD-10-CM

## 2024-05-14 PROCEDURE — 99213 OFFICE O/P EST LOW 20 MIN: CPT | Performed by: FAMILY MEDICINE

## 2024-05-14 PROCEDURE — G8427 DOCREV CUR MEDS BY ELIG CLIN: HCPCS | Performed by: FAMILY MEDICINE

## 2024-05-14 PROCEDURE — 1036F TOBACCO NON-USER: CPT | Performed by: FAMILY MEDICINE

## 2024-05-14 PROCEDURE — G8417 CALC BMI ABV UP PARAM F/U: HCPCS | Performed by: FAMILY MEDICINE

## 2024-05-14 PROCEDURE — 1123F ACP DISCUSS/DSCN MKR DOCD: CPT | Performed by: FAMILY MEDICINE

## 2024-05-14 PROCEDURE — 3017F COLORECTAL CA SCREEN DOC REV: CPT | Performed by: FAMILY MEDICINE

## 2024-05-14 RX ORDER — DOXYCYCLINE HYCLATE 100 MG
100 TABLET ORAL 2 TIMES DAILY
Qty: 20 TABLET | Refills: 0 | Status: SHIPPED | OUTPATIENT
Start: 2024-05-14 | End: 2024-05-24

## 2024-05-14 RX ORDER — DEXTROMETHORPHAN HYDROBROMIDE AND PROMETHAZINE HYDROCHLORIDE 15; 6.25 MG/5ML; MG/5ML
5 SYRUP ORAL 4 TIMES DAILY PRN
Qty: 140 ML | Refills: 0 | Status: SHIPPED | OUTPATIENT
Start: 2024-05-14 | End: 2024-05-21

## 2024-05-14 NOTE — PROGRESS NOTES
Chief Complaint   Patient presents with    Cough     History obtained from the patient.    SUBJECTIVE:  Nick Marroquin is a 69 y.o. male that presents today for     -URI type sxs:   10 days  Seen UC last wk  Given alb/mucinex, no better    Cough  Congestion  Fatigue  No fever  No SOB    Fever - No  Runny nose or congestion -  Yes   Cough -  Yes  Sore throat -  Yes  Headache, fatigue, joint pains, muscle aches -  No  Double Sickening - Yes  Shortness of breath/Wheezing? -  No  Nausea/Vomiting/Diarrhea?  No  Sick contacts - No  Maxillary Tooth Pain -  No  Treatment tried and response - as above, no better      Age/Gender Health Maintenance    Lipid -   ; ; HDL 38; TG 92 (MARCH 2024)    Lab Results   Component Value Date    CHOL 165 03/28/2023    CHOL 170 01/20/2021    CHOL 161 06/11/2019     Lab Results   Component Value Date    TRIG 101 03/28/2023    TRIG 68 01/20/2021    TRIG 65 06/11/2019     Lab Results   Component Value Date    HDL 40 03/28/2023    HDL 40 01/20/2021    HDL 43 06/11/2019     Lab Results   Component Value Date     03/28/2023     01/20/2021     06/11/2019       DM Screen -    (MARCH 2024)    Lab Results   Component Value Date/Time    GLUCOSE 113 03/28/2023 12:00 AM    GLUCOSE 97 03/30/2018 11:00 AM       Colon Cancer Screening - NEG MAY 2022, repeat 10 years.     Tetanus - UTD MAY 2014  Influenza Vaccine - Candidate FALL 2024  Pneumonia Vaccine - UTD PCV 13 (June 2018)/PPV23 in June 2019  Zoster - to get at pharmacy per medicare rules    PSA testing discussion -   PSA: 1.1 (MARCH 2024)  PSA: 0.9 (MARCH 2023)  PSA 0.8 (APR 2022)  NOV 2015  PSA, Ultrasensitive 0.78 <4.0 ng/ml Final     Lab Results   Component Value Date    PSA 0.9 01/17/2021    PSA 1.25 (H) 06/11/2019    PSA 1.31 01/11/2017     Component      Latest Ref Rng & Units 3/30/2018          11:00 AM   PSA, Ultrasensitive      <=4.00 ng/mL 1.180       AAA Screening - non-smoker      Current

## 2024-06-05 ENCOUNTER — TELEPHONE (OUTPATIENT)
Dept: FAMILY MEDICINE CLINIC | Age: 69
End: 2024-06-05

## 2024-06-05 DIAGNOSIS — R73.9 HYPERGLYCEMIA: Primary | ICD-10-CM

## 2024-06-05 NOTE — TELEPHONE ENCOUNTER
Called patient and informed of message.   Patient verbalized understanding.   No questions or concerns at this time.     Lab order mailed to patient with instructions on when and how to complete.     Patient also requesting this information be sent to him on Yoomly message.     Yoomly sent

## 2024-06-05 NOTE — TELEPHONE ENCOUNTER
----- Message from Mike Painting DO sent at 6/5/2024  6:43 AM EDT -----  Please let pt know that A1c came back a bit elevated at 6.6  This is right on the border of type 2 diabetes.   I'd like him to work on lower carb diet and some wt loss  I'd like to repeat labs in 3 months, fasting to f/u on this. Labs ordered  Let me know if questions, thanks!

## 2024-07-16 ENCOUNTER — OFFICE VISIT (OUTPATIENT)
Dept: FAMILY MEDICINE CLINIC | Age: 69
End: 2024-07-16
Payer: MEDICARE

## 2024-07-16 VITALS
DIASTOLIC BLOOD PRESSURE: 84 MMHG | WEIGHT: 221.6 LBS | RESPIRATION RATE: 18 BRPM | HEART RATE: 74 BPM | BODY MASS INDEX: 34.78 KG/M2 | TEMPERATURE: 97.7 F | OXYGEN SATURATION: 98 % | SYSTOLIC BLOOD PRESSURE: 134 MMHG | HEIGHT: 67 IN

## 2024-07-16 DIAGNOSIS — M54.50 CHRONIC BILATERAL LOW BACK PAIN WITHOUT SCIATICA: Primary | Chronic | ICD-10-CM

## 2024-07-16 DIAGNOSIS — G89.29 CHRONIC BILATERAL LOW BACK PAIN WITHOUT SCIATICA: Primary | Chronic | ICD-10-CM

## 2024-07-16 PROCEDURE — 1123F ACP DISCUSS/DSCN MKR DOCD: CPT | Performed by: FAMILY MEDICINE

## 2024-07-16 PROCEDURE — G8417 CALC BMI ABV UP PARAM F/U: HCPCS | Performed by: FAMILY MEDICINE

## 2024-07-16 PROCEDURE — 3017F COLORECTAL CA SCREEN DOC REV: CPT | Performed by: FAMILY MEDICINE

## 2024-07-16 PROCEDURE — G8427 DOCREV CUR MEDS BY ELIG CLIN: HCPCS | Performed by: FAMILY MEDICINE

## 2024-07-16 PROCEDURE — 99214 OFFICE O/P EST MOD 30 MIN: CPT | Performed by: FAMILY MEDICINE

## 2024-07-16 PROCEDURE — 1036F TOBACCO NON-USER: CPT | Performed by: FAMILY MEDICINE

## 2024-07-16 RX ORDER — TIZANIDINE 4 MG/1
4 TABLET ORAL EVERY 8 HOURS PRN
Qty: 90 TABLET | Refills: 3 | Status: SHIPPED | OUTPATIENT
Start: 2024-07-16

## 2024-07-16 NOTE — PROGRESS NOTES
loss of both ears     Hearing aids    Hepatic cyst     Benign (see imaging)    History of basal cell carcinoma     Skin - left shoulder     History of compression fracture of lumbar spine, L1     While in the [employer] in his 20's    History of skin cancer 08/25/2015    History of small bowel obstruction     Many years ago    Hyperlipidemia     Insomnia     Primary osteoarthritis of left hand 08/27/2015    Thoracic aortic aneurysm (TAA) (HCC)        Past Surgical History:   Procedure Laterality Date    SKIN BIOPSY      SKIN CANCER EXCISION      TONSILLECTOMY AND ADENOIDECTOMY      WISDOM TOOTH EXTRACTION       No Known Allergies    Social History     Tobacco Use    Smoking status: Never    Smokeless tobacco: Never   Substance Use Topics    Alcohol use: No       Family History   Problem Relation Age of Onset    Heart Disease Mother     Cancer Father     Prostate Cancer Father 80    Colon Cancer Neg Hx        I have reviewed the patient's past medical history, past surgical history, allergies, medications, social and family history and I have made updates where appropriate.      Review of Systems  Positive responses are highlighted in bold    Constitutional:  Fever, Chills, Night Sweats, Fatigue, Unexpected changes in weight  HENT:  Ear pain, Tinnitus, Nosebleeds, Trouble swallowing, Hearing loss, Sore throat  Cardiovascular:  Chest Pain, Palpitations, Orthopnea, Paroxysmal Nocturnal Dyspnea  Respiratory:  Cough, Wheezing, Shortness of breath, Chest tightness, Apnea  Gastrointestinal:  Nausea, Vomiting, Diarrhea, Constipation, Heartburn, Blood in stool  Genitourinary:  Difficulty or painful urination, Flank pain, Change in frequency, Urgency  Skin:  Color change, Rash, Itching, Wound  Musculoskeletal:  Joint pain, Back pain, Gait problems, Joint swelling, Myalgias  Neurological:  Dizziness, Headaches, Presyncope, Numbness, Seizures, Tremors  Endocrine:  Heat Intolerance, Cold Intolerance, Polydipsia, Polyphagia,

## 2024-07-23 ENCOUNTER — TELEPHONE (OUTPATIENT)
Dept: FAMILY MEDICINE CLINIC | Age: 69
End: 2024-07-23

## 2024-07-23 NOTE — TELEPHONE ENCOUNTER
Ok  Print it out, write that on it and re-fax  No other place we've ever referred to has required that, FYI

## 2024-07-23 NOTE — TELEPHONE ENCOUNTER
[facility] physical therapy called and said on the physical therapy order it needs to state eval and treat at the bottom of the order.       Fax [phone]

## 2024-07-29 DIAGNOSIS — N52.9 ERECTILE DYSFUNCTION, UNSPECIFIED ERECTILE DYSFUNCTION TYPE: ICD-10-CM

## 2024-07-29 RX ORDER — SILDENAFIL 50 MG/1
50 TABLET, FILM COATED ORAL PRN
Qty: 9 TABLET | Refills: 3 | Status: SHIPPED | OUTPATIENT
Start: 2024-07-29

## 2024-07-29 NOTE — TELEPHONE ENCOUNTER
Recent Visits  Date Type Provider Dept   07/16/24 Office Visit Mike Painting, DO [facility]   05/14/24 Office Visit Mike Painting, DO [facility]   04/09/24 Office Visit Mike Painting, DO [facility]   08/02/23 Office Visit Mike Painting, DO [facility]   03/29/23 Office Visit Mike Painting, DO [facility]   02/16/23 Office Visit Mike Painting, DO [facility]   Showing recent visits within past 540 days with a meds authorizing provider and meeting all other requirements  Future Appointments  No visits were found meeting these conditions.  Showing future appointments within next 150 days with a meds authorizing provider and meeting all other requirements

## 2024-08-12 ENCOUNTER — TELEPHONE (OUTPATIENT)
Dept: FAMILY MEDICINE CLINIC | Age: 69
End: 2024-08-12

## 2024-08-12 NOTE — TELEPHONE ENCOUNTER
----- Message from Dr. Mike Painting, DO sent at 8/12/2024  9:50 AM EDT -----  Please let pt know that xray shows on acute findings  Shows arthritic changes and old T12 compression fxr  Con't with plan from office  Let me know if questions, thanks!

## 2024-11-20 ENCOUNTER — OFFICE VISIT (OUTPATIENT)
Dept: FAMILY MEDICINE CLINIC | Age: 69
End: 2024-11-20

## 2024-11-20 VITALS
BODY MASS INDEX: 34.72 KG/M2 | HEIGHT: 67 IN | DIASTOLIC BLOOD PRESSURE: 82 MMHG | TEMPERATURE: 97.1 F | RESPIRATION RATE: 18 BRPM | SYSTOLIC BLOOD PRESSURE: 132 MMHG | WEIGHT: 221.2 LBS | HEART RATE: 80 BPM | OXYGEN SATURATION: 98 %

## 2024-11-20 DIAGNOSIS — J32.9 SINOBRONCHITIS: Primary | ICD-10-CM

## 2024-11-20 DIAGNOSIS — J40 SINOBRONCHITIS: Primary | ICD-10-CM

## 2024-11-20 RX ORDER — DOXYCYCLINE 100 MG/1
100 CAPSULE ORAL 2 TIMES DAILY
COMMUNITY

## 2024-11-20 RX ORDER — CEFDINIR 300 MG/1
300 CAPSULE ORAL 2 TIMES DAILY
Qty: 20 CAPSULE | Refills: 0 | Status: SHIPPED | OUTPATIENT
Start: 2024-11-20 | End: 2024-11-30

## 2024-11-20 RX ORDER — BENZONATATE 200 MG/1
200 CAPSULE ORAL 3 TIMES DAILY PRN
COMMUNITY

## 2024-11-20 RX ORDER — DEXTROMETHORPHAN HYDROBROMIDE AND PROMETHAZINE HYDROCHLORIDE 15; 6.25 MG/5ML; MG/5ML
5 SYRUP ORAL 4 TIMES DAILY PRN
Qty: 140 ML | Refills: 0 | Status: SHIPPED | OUTPATIENT
Start: 2024-11-20 | End: 2024-11-27

## 2024-11-27 ENCOUNTER — TELEPHONE (OUTPATIENT)
Dept: FAMILY MEDICINE CLINIC | Age: 69
End: 2024-11-27

## 2024-11-27 NOTE — TELEPHONE ENCOUNTER
----- Message from Dr. Mike Painting, DO sent at 11/27/2024  5:08 AM EST -----  Pt had f/u FBS/A1C ordered June 2024 to be done in SEPT 2024.  Was he able to get those done? I don't have any results.   If he's had them done, please get results.   If he's not, would recommend getting done soon, fasting.     Let me know, thanks!

## 2024-11-27 NOTE — TELEPHONE ENCOUNTER
Spoke with pt he states he had the labs done @ [facility] in Oct    I called [facility] medical records spoke with Danielle she will fax results

## 2025-03-13 ENCOUNTER — TELEPHONE (OUTPATIENT)
Dept: FAMILY MEDICINE CLINIC | Age: 70
End: 2025-03-13

## 2025-03-13 DIAGNOSIS — R73.9 HYPERGLYCEMIA: ICD-10-CM

## 2025-03-13 DIAGNOSIS — E78.2 MIXED HYPERLIPIDEMIA: Primary | Chronic | ICD-10-CM

## 2025-03-13 DIAGNOSIS — Z12.5 SPECIAL SCREENING FOR MALIGNANT NEOPLASM OF PROSTATE: ICD-10-CM

## 2025-03-13 NOTE — TELEPHONE ENCOUNTER
Pt due for fasting labs prior to next apt on 4/14/2025. Please call to have pt complete this. Thanks!    ASSESSMENT & PLAN   Diagnosis Orders   1. Mixed hyperlipidemia  CBC with Auto Differential    Comprehensive Metabolic Panel    Lipid Panel    TSH reflex to FT4      2. Hyperglycemia  Hemoglobin A1C      3. Special screening for malignant neoplasm of prostate  PSA Screening        Future Appointments   Date Time Provider Department Center   4/14/2025  1:20 PM Mike Painting, DO [facility] DE[facility]

## 2025-04-08 LAB
ALBUMIN: 4.3 G/DL
ALP BLD-CCNC: 55 U/L
ALT SERPL-CCNC: 15 U/L
ANION GAP SERPL CALCULATED.3IONS-SCNC: NORMAL MMOL/L
AST SERPL-CCNC: 22 U/L
BASOPHILS ABSOLUTE: 0.1 /ΜL
BASOPHILS RELATIVE PERCENT: 1 %
BILIRUB SERPL-MCNC: 0.5 MG/DL (ref 0.1–1.4)
BUN BLDV-MCNC: 13 MG/DL
CALCIUM SERPL-MCNC: 9.1 MG/DL
CHLORIDE BLD-SCNC: 104 MMOL/L
CHOLESTEROL, TOTAL: 183 MG/DL
CHOLESTEROL/HDL RATIO: NORMAL
CO2: 22 MMOL/L
CREAT SERPL-MCNC: 0.84 MG/DL
EOSINOPHILS ABSOLUTE: 0.3 /ΜL
EOSINOPHILS RELATIVE PERCENT: 5 %
ESTIMATED AVERAGE GLUCOSE: NORMAL
GFR, ESTIMATED: 93
GLUCOSE BLD-MCNC: 105 MG/DL
HBA1C MFR BLD: 6.8 %
HCT VFR BLD CALC: 42.7 % (ref 41–53)
HDLC SERPL-MCNC: 36 MG/DL (ref 35–70)
HEMOGLOBIN: 13.9 G/DL (ref 13.5–17.5)
LDL CHOLESTEROL: 129
LYMPHOCYTES ABSOLUTE: 1.8 /ΜL
LYMPHOCYTES RELATIVE PERCENT: 32 %
MCH RBC QN AUTO: 29.4 PG
MCHC RBC AUTO-ENTMCNC: 32.6 G/DL
MCV RBC AUTO: 91 FL
MONOCYTES ABSOLUTE: 0.5 /ΜL
MONOCYTES RELATIVE PERCENT: 9 %
NEUTROPHILS ABSOLUTE: 2.9 /ΜL
NEUTROPHILS RELATIVE PERCENT: 53 %
NONHDLC SERPL-MCNC: NORMAL MG/DL
PDW BLD-RTO: 12.8 %
PLATELET # BLD: 187 K/ΜL
PMV BLD AUTO: NORMAL FL
POTASSIUM SERPL-SCNC: 4.9 MMOL/L
PROSTATE SPECIFIC ANTIGEN: 1.2 NG/ML
RBC # BLD: 4.72 10^6/ΜL
SODIUM BLD-SCNC: 141 MMOL/L
TOTAL PROTEIN: 7.1 G/DL (ref 6.4–8.2)
TRIGL SERPL-MCNC: 99 MG/DL
TSH SERPL DL<=0.05 MIU/L-ACNC: 3 UIU/ML
VLDLC SERPL CALC-MCNC: 18 MG/DL
WBC # BLD: 5.6 10^3/ML

## 2025-04-10 ENCOUNTER — RESULTS FOLLOW-UP (OUTPATIENT)
Dept: FAMILY MEDICINE CLINIC | Age: 70
End: 2025-04-10

## 2025-04-10 ENCOUNTER — TELEPHONE (OUTPATIENT)
Dept: FAMILY MEDICINE CLINIC | Age: 70
End: 2025-04-10

## 2025-04-10 NOTE — TELEPHONE ENCOUNTER
Mike Painting, DO  [facility]  Please let pt know that labs are back.  A1c high at 6.8. This puts him the type 2 diabetic range, though well controlled.  Rest of labs look good.  Will discuss in more detail at his f/u apt next wk.  Let me know if questions, thanks!

## 2025-04-15 ENCOUNTER — TELEPHONE (OUTPATIENT)
Dept: FAMILY MEDICINE CLINIC | Age: 70
End: 2025-04-15

## 2025-04-15 NOTE — TELEPHONE ENCOUNTER
Future Appointments   Date Time Provider Department Center   4/30/2025  2:20 PM Mike Painting, DO [facility] DE[facility]     Patient called back and got rescheduled

## 2025-04-15 NOTE — TELEPHONE ENCOUNTER
Left message on answering machine requesting pt to call back at earliest convenience.     Patient was no show at appointment today, please help him get rescheduled and make sure patient is ok

## 2025-05-07 PROBLEM — N52.9 ERECTILE DYSFUNCTION: Chronic | Status: ACTIVE | Noted: 2025-05-07

## 2025-05-07 PROBLEM — N52.9 ERECTILE DYSFUNCTION: Status: ACTIVE | Noted: 2025-05-07

## 2025-05-07 NOTE — PROGRESS NOTES
Chief Complaint   Patient presents with    Medicare AWV    Diabetes    Follow-up     Issues noted below     History obtained from the patient.    SUBJECTIVE:  Nick Marroquin is a 70 y.o. male that presents today for     -DM2:  New dx  A1c 6.8  Discussed at length today  Not interested in meds or chem sticks  Will work on diet changes/wt loss    Lab Results   Component Value Date/Time    LABA1C 6.8 04/08/2025 07:55 AM       -Mild Dilated ascending aorta/AAA:   Dilated thoracic aorta found incidentally on echo JAN 2018. No CP/SOB.   Due for CTA chest  Had small AAA noted on a prior US  However, on most recent US in March 2023, no AAA was seen  Denies CP, SOB  No abd pain      -Chronic Low Back Pain:    HPI:   Chronic issue for years  Had L1 compression fxr while in [employer]  Worse the last several month  Seeing Chiropractor regularly  Pain is upper lumbar spine  No radicular sxs  No bowel/bladder issues  Better with rest  Worse with standing, bending and twisting  On Zanaflex in the past, was helpful/     Inciting injury or history of trauma? No  Pain is relieved by - as above  Pain is aggravated by - as above  Radiation of the pain? No  Paresthesias of the extremities?  No  Saddle anesthesia? No  Bowel or bladder incontinence? No  Treatments tried - motrin, chiro, helping some.       -HLD:     HPI:     Taking meds as prescribed ?: yes  Tolerating well ?: yes  Side Effects ?: denies  Muscle Pain?: denies  Working on TLCS ?: yes        -Bipolar INITIAL PRESENTATION: See's psych in [place], has been stable on depakote for many years. Denies manic or depressive sxs. See's therapist twice per month. Plans to con't to see psyc once per year, but would like me to fill depakote for him. Denies SI/HI.     UPDATE PRIOR Visit: Pt here feeling manic with high energy and lack of sleep. Denies SI/HI or risk taking behavior. At last visit pt related he was only taking depakote 250mg ER once daily. However, today he relates that

## 2025-05-08 ENCOUNTER — OFFICE VISIT (OUTPATIENT)
Dept: FAMILY MEDICINE CLINIC | Age: 70
End: 2025-05-08
Payer: MEDICARE

## 2025-05-08 VITALS
HEIGHT: 67 IN | DIASTOLIC BLOOD PRESSURE: 80 MMHG | RESPIRATION RATE: 16 BRPM | WEIGHT: 218.6 LBS | OXYGEN SATURATION: 98 % | SYSTOLIC BLOOD PRESSURE: 132 MMHG | TEMPERATURE: 97.8 F | BODY MASS INDEX: 34.31 KG/M2 | HEART RATE: 72 BPM

## 2025-05-08 DIAGNOSIS — N52.9 ERECTILE DYSFUNCTION, UNSPECIFIED ERECTILE DYSFUNCTION TYPE: ICD-10-CM

## 2025-05-08 DIAGNOSIS — Z00.00 MEDICARE ANNUAL WELLNESS VISIT, SUBSEQUENT: Primary | ICD-10-CM

## 2025-05-08 DIAGNOSIS — M54.50 CHRONIC BILATERAL LOW BACK PAIN WITHOUT SCIATICA: ICD-10-CM

## 2025-05-08 DIAGNOSIS — F31.9 BIPOLAR 1 DISORDER (HCC): Chronic | ICD-10-CM

## 2025-05-08 DIAGNOSIS — I71.21 ANEURYSM OF ASCENDING AORTA WITHOUT RUPTURE: ICD-10-CM

## 2025-05-08 DIAGNOSIS — I71.40 ABDOMINAL AORTIC ANEURYSM (AAA) WITHOUT RUPTURE, UNSPECIFIED PART: ICD-10-CM

## 2025-05-08 DIAGNOSIS — E78.2 MIXED HYPERLIPIDEMIA: ICD-10-CM

## 2025-05-08 DIAGNOSIS — G89.29 CHRONIC BILATERAL LOW BACK PAIN WITHOUT SCIATICA: ICD-10-CM

## 2025-05-08 DIAGNOSIS — E11.9 DIABETES MELLITUS TYPE 2, DIET-CONTROLLED (HCC): Chronic | ICD-10-CM

## 2025-05-08 PROCEDURE — 1159F MED LIST DOCD IN RCRD: CPT | Performed by: FAMILY MEDICINE

## 2025-05-08 PROCEDURE — 1160F RVW MEDS BY RX/DR IN RCRD: CPT | Performed by: FAMILY MEDICINE

## 2025-05-08 PROCEDURE — 3017F COLORECTAL CA SCREEN DOC REV: CPT | Performed by: FAMILY MEDICINE

## 2025-05-08 PROCEDURE — 3044F HG A1C LEVEL LT 7.0%: CPT | Performed by: FAMILY MEDICINE

## 2025-05-08 PROCEDURE — 1123F ACP DISCUSS/DSCN MKR DOCD: CPT | Performed by: FAMILY MEDICINE

## 2025-05-08 PROCEDURE — G0439 PPPS, SUBSEQ VISIT: HCPCS | Performed by: FAMILY MEDICINE

## 2025-05-08 RX ORDER — ATORVASTATIN CALCIUM 20 MG/1
20 TABLET, FILM COATED ORAL DAILY
Qty: 90 TABLET | Refills: 3 | Status: SHIPPED | OUTPATIENT
Start: 2025-05-08

## 2025-05-08 ASSESSMENT — PATIENT HEALTH QUESTIONNAIRE - PHQ9
9. THOUGHTS THAT YOU WOULD BE BETTER OFF DEAD, OR OF HURTING YOURSELF: NOT AT ALL
SUM OF ALL RESPONSES TO PHQ QUESTIONS 1-9: 0
2. FEELING DOWN, DEPRESSED OR HOPELESS: NOT AT ALL
SUM OF ALL RESPONSES TO PHQ QUESTIONS 1-9: 0
3. TROUBLE FALLING OR STAYING ASLEEP: NOT AT ALL
SUM OF ALL RESPONSES TO PHQ QUESTIONS 1-9: 0
7. TROUBLE CONCENTRATING ON THINGS, SUCH AS READING THE NEWSPAPER OR WATCHING TELEVISION: NOT AT ALL
SUM OF ALL RESPONSES TO PHQ QUESTIONS 1-9: 0
5. POOR APPETITE OR OVEREATING: NOT AT ALL
10. IF YOU CHECKED OFF ANY PROBLEMS, HOW DIFFICULT HAVE THESE PROBLEMS MADE IT FOR YOU TO DO YOUR WORK, TAKE CARE OF THINGS AT HOME, OR GET ALONG WITH OTHER PEOPLE: NOT DIFFICULT AT ALL
SUM OF ALL RESPONSES TO PHQ QUESTIONS 1-9: 0
SUM OF ALL RESPONSES TO PHQ QUESTIONS 1-9: 0
4. FEELING TIRED OR HAVING LITTLE ENERGY: NOT AT ALL
6. FEELING BAD ABOUT YOURSELF - OR THAT YOU ARE A FAILURE OR HAVE LET YOURSELF OR YOUR FAMILY DOWN: NOT AT ALL
1. LITTLE INTEREST OR PLEASURE IN DOING THINGS: NOT AT ALL
8. MOVING OR SPEAKING SO SLOWLY THAT OTHER PEOPLE COULD HAVE NOTICED. OR THE OPPOSITE, BEING SO FIGETY OR RESTLESS THAT YOU HAVE BEEN MOVING AROUND A LOT MORE THAN USUAL: NOT AT ALL
1. LITTLE INTEREST OR PLEASURE IN DOING THINGS: NOT AT ALL
2. FEELING DOWN, DEPRESSED OR HOPELESS: NOT AT ALL

## 2025-05-08 ASSESSMENT — LIFESTYLE VARIABLES
HOW OFTEN DO YOU HAVE A DRINK CONTAINING ALCOHOL: NEVER
HOW MANY STANDARD DRINKS CONTAINING ALCOHOL DO YOU HAVE ON A TYPICAL DAY: PATIENT DOES NOT DRINK
HOW OFTEN DO YOU HAVE A DRINK CONTAINING ALCOHOL: NEVER

## 2025-05-08 NOTE — PATIENT INSTRUCTIONS
participating in exercise and having meals with you, even if they may be eating something different.  Find what works best for you. If you do not have time or do not like to cook, a program that offers meal replacement bars or shakes may be better for you. Or if you like to prepare meals, finding a plan that includes daily menus and recipes may be best.  Ask your doctor about other health professionals who can help you achieve your weight-loss goals.  A dietitian can help you make healthy changes in your diet.  An exercise specialist or  can help you develop a safe and effective exercise program.  A counselor or psychiatrist can help you cope with issues such as depression, anxiety, or family problems that can make it hard to focus on weight loss.  Consider joining a support group for people who are trying to lose weight. Your doctor can suggest groups in your area.  Where can you learn more?  Go to https://[email or web address]/patientEd and enter U357 to learn more about \"Starting a Weight-Loss Plan: Care Instructions.\"  Current as of: April 30, 2024  Content Version: 14.4  © 5687-8561 [employer].   Care instructions adapted under license by [employer]. If you have questions about a medical condition or this instruction, always ask your healthcare professional. [employer] disclaims any warranty or liability for your use of this information.         A Healthy Heart: Care Instructions  Overview     Coronary artery disease, also called heart disease, occurs when a substance called plaque builds up in the vessels that supply oxygen-rich blood to your heart muscle. This can narrow the blood vessels and reduce blood flow. A heart attack happens when blood flow is completely blocked. A high-fat diet, smoking, and other factors increase the risk of heart disease.  Your doctor has found that you have a chance of having heart disease. A heart-healthy lifestyle can help keep your heart

## 2025-06-05 ENCOUNTER — HOSPITAL ENCOUNTER (OUTPATIENT)
Dept: CT IMAGING | Age: 70
Discharge: HOME | End: 2025-06-05
Attending: FAMILY MEDICINE
Payer: MEDICARE

## 2025-06-05 DIAGNOSIS — I71.21 ANEURYSM OF ASCENDING AORTA WITHOUT RUPTURE: ICD-10-CM

## 2025-06-05 LAB — POC CREATININE WHOLE BLOOD: 0.8 MG/DL (ref 0.5–1.2)

## 2025-06-05 PROCEDURE — 6360000004 HC RX CONTRAST MEDICATION: Performed by: FAMILY MEDICINE

## 2025-06-05 PROCEDURE — 82565 ASSAY OF CREATININE: CPT

## 2025-06-05 PROCEDURE — 71275 CT ANGIOGRAPHY CHEST: CPT

## 2025-06-05 RX ORDER — IOPAMIDOL 755 MG/ML
80 INJECTION, SOLUTION INTRAVASCULAR
Status: COMPLETED | OUTPATIENT
Start: 2025-06-05 | End: 2025-06-05

## 2025-06-05 RX ADMIN — IOPAMIDOL 80 ML: 755 INJECTION, SOLUTION INTRAVENOUS at 13:42

## 2025-06-06 ENCOUNTER — TELEPHONE (OUTPATIENT)
Dept: FAMILY MEDICINE CLINIC | Age: 70
End: 2025-06-06

## 2025-06-06 ENCOUNTER — RESULTS FOLLOW-UP (OUTPATIENT)
Dept: FAMILY MEDICINE CLINIC | Age: 70
End: 2025-06-06

## 2025-06-06 NOTE — TELEPHONE ENCOUNTER
----- Message from Dr. Mike Painting, DO sent at 6/6/2025  7:08 AM EDT -----  Please let pt know that CTA chest remains stable.   This has been stable over several years now, we could space these scans out to every 2 years if pt ok with that. Let me know, thanks!     Let me know if questions, thanks!

## 2025-06-09 NOTE — TELEPHONE ENCOUNTER
Pt informed of CTA results. Pt voiced understanding with no further questions at this time.       He is ok with every 2 years.

## 2025-06-09 NOTE — TELEPHONE ENCOUNTER
Left message on answering machine. Requested pt to call back at [phone], at their earliest convenience.